# Patient Record
Sex: FEMALE | Race: WHITE
[De-identification: names, ages, dates, MRNs, and addresses within clinical notes are randomized per-mention and may not be internally consistent; named-entity substitution may affect disease eponyms.]

---

## 2017-09-17 ENCOUNTER — HOSPITAL ENCOUNTER (EMERGENCY)
Dept: HOSPITAL 62 - ER | Age: 72
Discharge: HOME | End: 2017-09-17
Payer: MEDICARE

## 2017-09-17 VITALS — DIASTOLIC BLOOD PRESSURE: 71 MMHG | SYSTOLIC BLOOD PRESSURE: 115 MMHG

## 2017-09-17 DIAGNOSIS — N39.0: Primary | ICD-10-CM

## 2017-09-17 DIAGNOSIS — R41.82: ICD-10-CM

## 2017-09-17 DIAGNOSIS — F02.80: ICD-10-CM

## 2017-09-17 DIAGNOSIS — I10: ICD-10-CM

## 2017-09-17 DIAGNOSIS — K21.9: ICD-10-CM

## 2017-09-17 DIAGNOSIS — R53.83: ICD-10-CM

## 2017-09-17 DIAGNOSIS — R53.1: ICD-10-CM

## 2017-09-17 DIAGNOSIS — G30.9: ICD-10-CM

## 2017-09-17 LAB
ALBUMIN SERPL-MCNC: 4 G/DL (ref 3.5–5)
ALP SERPL-CCNC: 47 U/L (ref 38–126)
ALT SERPL-CCNC: 12 U/L (ref 9–52)
ANION GAP SERPL CALC-SCNC: 11 MMOL/L (ref 5–19)
APPEARANCE UR: (no result)
AST SERPL-CCNC: 19 U/L (ref 14–36)
BASE EXCESS BLDV CALC-SCNC: 5 MMOL/L
BASOPHILS # BLD AUTO: 0 10^3/UL (ref 0–0.2)
BASOPHILS NFR BLD AUTO: 0.1 % (ref 0–2)
BILIRUB DIRECT SERPL-MCNC: 0.4 MG/DL (ref 0–0.4)
BILIRUB SERPL-MCNC: 0.7 MG/DL (ref 0.2–1.3)
BILIRUB UR QL STRIP: NEGATIVE
BUN SERPL-MCNC: 17 MG/DL (ref 7–20)
CALCIUM: 9.7 MG/DL (ref 8.4–10.2)
CHLORIDE SERPL-SCNC: 103 MMOL/L (ref 98–107)
CO2 SERPL-SCNC: 29 MMOL/L (ref 22–30)
CREAT SERPL-MCNC: 0.57 MG/DL (ref 0.52–1.25)
EOSINOPHIL # BLD AUTO: 0 10^3/UL (ref 0–0.6)
EOSINOPHIL NFR BLD AUTO: 0.1 % (ref 0–6)
ERYTHROCYTE [DISTWIDTH] IN BLOOD BY AUTOMATED COUNT: 13.2 % (ref 11.5–14)
GLUCOSE SERPL-MCNC: 144 MG/DL (ref 75–110)
GLUCOSE UR STRIP-MCNC: NEGATIVE MG/DL
HCO3 BLDV-SCNC: 30.9 MMOL/L (ref 20–32)
HCT VFR BLD CALC: 40.6 % (ref 36–47)
HGB BLD-MCNC: 14.3 G/DL (ref 12–15.5)
HGB HCT DIFFERENCE: 2.3
KETONES UR STRIP-MCNC: (no result) MG/DL
LYMPHOCYTES # BLD AUTO: 0.9 10^3/UL (ref 0.5–4.7)
LYMPHOCYTES NFR BLD AUTO: 8 % (ref 13–45)
MCH RBC QN AUTO: 30.6 PG (ref 27–33.4)
MCHC RBC AUTO-ENTMCNC: 35.1 G/DL (ref 32–36)
MCV RBC AUTO: 87 FL (ref 80–97)
MONOCYTES # BLD AUTO: 1.4 10^3/UL (ref 0.1–1.4)
MONOCYTES NFR BLD AUTO: 12.3 % (ref 3–13)
NEUTROPHILS # BLD AUTO: 8.9 10^3/UL (ref 1.7–8.2)
NEUTS SEG NFR BLD AUTO: 79.5 % (ref 42–78)
NITRITE UR QL STRIP: POSITIVE
PCO2 BLDV: 49.7 MMHG (ref 35–63)
PH BLDV: 7.41 [PH] (ref 7.3–7.42)
PH UR STRIP: 7 [PH] (ref 5–9)
POTASSIUM SERPL-SCNC: 3.9 MMOL/L (ref 3.6–5)
PROT SERPL-MCNC: 7.8 G/DL (ref 6.3–8.2)
PROT UR STRIP-MCNC: 100 MG/DL
PROTHROMBIN TIME: 13.1 SEC (ref 11.4–15.4)
RBC # BLD AUTO: 4.66 10^6/UL (ref 3.72–5.28)
SODIUM SERPL-SCNC: 143.3 MMOL/L (ref 137–145)
SP GR UR STRIP: 1.01
UROBILINOGEN UR-MCNC: NEGATIVE MG/DL (ref ?–2)
WBC # BLD AUTO: 11.2 10^3/UL (ref 4–10.5)

## 2017-09-17 PROCEDURE — 93005 ELECTROCARDIOGRAM TRACING: CPT

## 2017-09-17 PROCEDURE — 36415 COLL VENOUS BLD VENIPUNCTURE: CPT

## 2017-09-17 PROCEDURE — 99285 EMERGENCY DEPT VISIT HI MDM: CPT

## 2017-09-17 PROCEDURE — 83605 ASSAY OF LACTIC ACID: CPT

## 2017-09-17 PROCEDURE — 80053 COMPREHEN METABOLIC PANEL: CPT

## 2017-09-17 PROCEDURE — 82962 GLUCOSE BLOOD TEST: CPT

## 2017-09-17 PROCEDURE — 85610 PROTHROMBIN TIME: CPT

## 2017-09-17 PROCEDURE — 85025 COMPLETE CBC W/AUTO DIFF WBC: CPT

## 2017-09-17 PROCEDURE — 81001 URINALYSIS AUTO W/SCOPE: CPT

## 2017-09-17 PROCEDURE — 85379 FIBRIN DEGRADATION QUANT: CPT

## 2017-09-17 PROCEDURE — 93010 ELECTROCARDIOGRAM REPORT: CPT

## 2017-09-17 PROCEDURE — 96360 HYDRATION IV INFUSION INIT: CPT

## 2017-09-17 PROCEDURE — 70450 CT HEAD/BRAIN W/O DYE: CPT

## 2017-09-17 PROCEDURE — 51701 INSERT BLADDER CATHETER: CPT

## 2017-09-17 PROCEDURE — 87086 URINE CULTURE/COLONY COUNT: CPT

## 2017-09-17 PROCEDURE — 87040 BLOOD CULTURE FOR BACTERIA: CPT

## 2017-09-17 PROCEDURE — 84484 ASSAY OF TROPONIN QUANT: CPT

## 2017-09-17 PROCEDURE — 82803 BLOOD GASES ANY COMBINATION: CPT

## 2017-09-17 PROCEDURE — 71020: CPT

## 2017-09-17 NOTE — RADIOLOGY REPORT (SQ)
EXAM DESCRIPTION:  CT HEAD WITHOUT



COMPLETED DATE/TIME:  9/17/2017 2:26 pm



REASON FOR STUDY:  ams



COMPARISON:  9/19/2015.



TECHNIQUE:  Axial images acquired through the brain without intravenous contrast.  Images reviewed wi
th bone, brain and subdural windows.  Images stored on PACS.

All CT scanners at this facility use dose modulation, iterative reconstruction, and/or weight based d
osing when appropriate to reduce radiation dose to as low as reasonably achievable (ALARA).

[facility]: Dose Right  [facility]: CareDose    [facility]: Dose Right    [facility]: Teradose 4D    [facility]: Smart [facility] [facility]



RADIATION DOSE:  Up-to-date CT equipment and radiation dose reduction techniques were employed. CTDIv
ol: 64.6 mGy. DLP: 1163 mGy-cm.mGy.



LIMITATIONS:  None.



FINDINGS:  VENTRICLES: Prominent.

CEREBRUM: No masses.  No hemorrhage.  No midline shift.  Areas of low density in the white matter mos
t likely due to chronic micro-vascular ischemic change.  No evidence for acute infarction.

CEREBELLUM: No masses.  No hemorrhage.  No alteration of density.  No evidence for acute infarction.

EXTRAAXIAL SPACES: Age-related involutional change.  No fluid collections.  No masses.

ORBITS AND GLOBE: No intra- or extraconal masses.  Normal contour of globe without masses.

CALVARIUM: No fracture.

PARANASAL SINUSES: No fluid or mucosal thickening.

SOFT TISSUES: No mass or hematoma.

OTHER: No other significant finding.



IMPRESSION:  CHRONIC CHANGES OF ATROPHY AND MICROVASCULAR ISCHEMIA.  NO ACUTE PROCESS.



TECHNICAL DOCUMENTATION:  JOB ID:  [id number]

Quality ID # [id number]: Final reports with documentation of one or more dose reduction techniques (e.g., Au
tomated exposure control, adjustment of the mA and/or kV according to patient size, use of iterative 
reconstruction technique)

 2011 [employer]- All Rights Reserved

## 2017-09-17 NOTE — RADIOLOGY REPORT (SQ)
EXAM DESCRIPTION:  CHEST PA/LAT



COMPLETED DATE/TIME:  9/17/2017 2:34 pm



REASON FOR STUDY:  sob



COMPARISON:  9/15/2016.



EXAM PARAMETERS:  NUMBER OF VIEWS: two views

TECHNIQUE: Digital Frontal and Lateral radiographic views of the chest acquired.

RADIATION DOSE: NA

LIMITATIONS: none



FINDINGS:  LUNGS AND PLEURA: No opacities, masses or pneumothorax. No pleural effusion.

MEDIASTINUM AND HILAR STRUCTURES: No masses or contour abnormalities.

HEART AND VASCULAR STRUCTURES: Heart upper limits of normal size.  No evidence for failure.

BONES: No acute findings.

HARDWARE: None in the chest.

OTHER: No other significant finding.



IMPRESSION:  NO SIGNIFICANT RADIOGRAPHIC FINDING IN THE CHEST.



TECHNICAL DOCUMENTATION:  JOB ID:  [id number]

 2011 [employer]- All Rights Reserved

## 2017-09-17 NOTE — ER DOCUMENT REPORT
ED Medical Screen (RME)





- General


Chief Complaint: General Weakness


Stated Complaint: LETHARGIC


Time Seen by Provider: 09/17/17 13:39


TRAVEL OUTSIDE OF THE [place] IN LAST 30 DAYS: No





- HPI


Notes: 





09/17/17 13:44


ams since tihs am





- Related Data


Allergies/Adverse Reactions: 


 





No Known Allergies Allergy (Verified 09/19/15 20:19)


 











Past Medical History





- Past Medical History


Cardiac Medical History: Reports: Hx Hypertension


Renal/ Medical History: Denies: Hx Peritoneal Dialysis


GI Medical History: Reports: Hx Gastroesophageal Reflux Disease


Psychiatric Medical History: Reports: Hx Dementia





- Immunizations


Hx Diphtheria, Pertussis, Tetanus Vaccination: Yes





Review of Systems





- Review of Systems


Constitutional: Other - ams





Physical Exam





- Vital signs


Vitals: 





 











Temp Pulse Resp BP Pulse Ox


 


 98.2 F   102 H  18   115/71   94 


 


 09/17/17 13:22  09/17/17 13:22  09/17/17 13:22  09/17/17 13:22  09/17/17 13:22














- Respiratory


Respiratory status: No respiratory distress


Chest status: Nontender


Breath sounds: Normal


Chest palpation: Normal





Course





- Vital Signs


Vital signs: 





 











Temp Pulse Resp BP Pulse Ox


 


 98.2 F   102 H  18   115/71   94 


 


 09/17/17 13:22  09/17/17 13:22  09/17/17 13:22  09/17/17 13:22  09/17/17 13:22

## 2017-09-17 NOTE — ER DOCUMENT REPORT
ED General





- General


Chief Complaint: General Weakness


Stated Complaint: LETHARGIC


Time Seen by Provider: 09/17/17 13:39


TRAVEL OUTSIDE OF THE [place] IN LAST 30 DAYS: No





- HPI


Notes: 





Patient is a 72-year-old female with a history of Alzheimer's, hypertension, 

and GERD who presents to the ED with son and daughter with altered mental 

status that began this morning.  Son states that she has not been acting like 

herself, has been acting weaker than usual, and is not as communicative as she 

normally is.   patient has also not been eating well today.  She still 

producing wet and dirty diapers.  Patient does have a 24-hour caregiver in her 

daughter.  Son states that she does have a history of recurrent UTIs.  Patient 

is only able to ambulate with assistance, but she has not been wanting to stand

  even with the assistance today.  Son states that she still will talk randomly 

at times without any provocation.  She has not started any new medications.  

Denies any drug allergies.  Per Son:  denies any headache, fever, head injury, 

neck pain, URI, syncope, cough, shortness of breath, wheeze, dyspnea, vomiting/

diarrhea, urinary retention, hematuria, muscle paralysis/weakness, or rash.





- Related Data


Allergies/Adverse Reactions: 


 





No Known Allergies Allergy (Verified 09/19/15 20:19)


 











Past Medical History





- Social History


Smoking Status: Never Smoker


Chew tobacco use (# tins/day): No


Frequency of alcohol use: None


Drug Abuse: None


Family History: Reviewed & Not Pertinent





- Past Medical History


Cardiac Medical History: Reports: Hx Hypertension


Renal/ Medical History: Denies: Hx Peritoneal Dialysis


GI Medical History: Reports: Hx Gastroesophageal Reflux Disease


Psychiatric Medical History: Reports: Hx Dementia





- Immunizations


Hx Diphtheria, Pertussis, Tetanus Vaccination: Yes





Review of Systems





- Review of Systems


-: Yes ROS unobtainable due to patient's medical condition - see hpi otherwise 

for pertinent ROS


Hematologic/Lymphatic: denies: Easy bleeding, Easy bruising


Neurological/Psychological: denies: Seizure





Physical Exam





- Vital signs


Vitals: 


 











Temp Pulse Resp BP Pulse Ox


 


 98.2 F   102 H  18   115/71   94 


 


 09/17/17 13:22  09/17/17 13:22  09/17/17 13:22  09/17/17 13:22  09/17/17 13:22











Notes: 





PHYSICAL EXAMINATION:





GENERAL: Well-appearing, well-nourished and in no acute distress.  Alert and 

arousable.





HEAD: Atraumatic, normocephalic.  Non-tender.  No bhatia sign





EYES: Pupils equal round and reactive to light, extraocular movements intact, 

sclera anicteric, conjunctiva are normal.  No raccoon eyes/entrapment





ENT: EAC clear b/l.  TM's intact b/l without erythema, fluid, or perforation.  

Nares patent and without discharge.  oropharynx clear without exudates.  No 

tonsilar hypertrophy or erythema.  Moist mucous membranes.  No sinus 

tenderness.  No hemotympanum/CSF discharge.





NECK: Normal range of motion, supple without lymphadenopathy.  No rigidity.  No 

midline tenderness. 





Chest:  No flail chest.  equal rise/fall. Non-tender





LUNGS: Breath sounds clear to auscultation bilaterally and equal.  No wheezes 

rales or rhonchi.





HEART: Regular rate and rhythm without murmurs, rubs, gallops.





ABDOMEN: Soft, nontender, nondistended abdomen.  No guarding, no rebound.  No 

masses appreciated.  Normal bowel sounds present.  No CVA tenderness 

bilaterally.   





Musculoskeletal: Ext b/l:  FROM to passive/active. Strength 5+/5.  No deficits 

noted.  No bony tenderness of extremities.  No calf warmth, erythema, or 

tenderness to palp.  Deedee neg.





Extremities:  No cyanosis, clubbing, or edema b/l.  Peripheral pulses 2+.  

Capillary refill less than 2 seconds.





NEUROLOGICAL: unable to perform MMSE/NIH due to patient's Alzheimers.  Cranial 

nerves grossly intact.  Normal speech when she does speak (does not sound 

slurred, but is spoken in a low voice.  Normal sensory.  Reflexes 2+ b/l. 





PSYCH: Normal mood, normal affect.





SKIN: Warm, Dry, normal turgor, no rashes or lesions noted.





Course





- Re-evaluation


Re-evalutation: 





09/17/17 16:14


Reviewed case with Dr. Paul who is in agreement with discharge/plan:


Patient is an afebrile, well-hydrated, 72-year-old female who presents to the 

ED with altered mental status and UTI.  Vitals are stable.  PE otherwise 

unremarkable for any focal neurological deficits otherwise.  Exam was limited 

due to patient's Alzheimer's.  CBC showed a very mildly elevated white count of 

11.2.  CMP was generally unremarkable with a lactic acid of 2.4.  Urinalysis 

did show nitrates.  Urine culture is pending.  CT scan of the head and chest x-

ray were unremarkable for any acute pathology.  EKG and cardiac enzymes also 

unremarkable for any acute pathology at this time.  There was a very mildly 

elevated d-dimer at 1.05, but patient is not tachypneic, tachycardic, hyper/

hypotensive, not in any respiratory distress, no obvious signs of chest pain, 

and a well's score of 0.  No further evaluation warranted at this time with the 

low risk factors.  Rocephin 1 g was given IV today.  I will send her home with 

keflex to take as directed.  Recheck with your PCM in 2-3 days.  Return to the 

ED with any worsening/concerning symptoms otherwise as reviewed in discharge.  

Son is in agreement.





Low suspicion for any acute glaucoma, temporal arteritis, meningitis, 

intracranial hemorrhage, ischemic stroke, ACS, PE, pneumothorax, pericarditis, 

dissection, acute abdomen, mediastinitis, or fracture at this time.  Son is 

aware that her condition can change from initial presentation and that he needs 

to monitor symptoms closely for any acute changes and seek medical attention if 

so.








- Vital Signs


Vital signs: 


 











Temp Pulse Resp BP Pulse Ox


 


 98.2 F   102 H  18   115/71   94 


 


 09/17/17 13:22  09/17/17 13:22  09/17/17 13:22  09/17/17 13:22  09/17/17 13:22














- Laboratory


Result Diagrams: 


 09/17/17 13:54





 09/17/17 13:54


Laboratory results interpreted by me: 


 











  09/17/17 09/17/17 09/17/17





  13:54 13:54 13:54


 


WBC  11.2 H  


 


Seg Neutrophils %  79.5 H  


 


Lymphocytes %  8.0 L  


 


Absolute Neutrophils  8.9 H  


 


D-Dimer   


 


Glucose   144 H 


 


POC Glucose   


 


Lactic Acid    2.4 H


 


Urine Protein   


 


Urine Ketones   


 


Urine Blood   


 


Urine Nitrite   














  09/17/17 09/17/17 09/17/17





  13:54 13:57 14:42


 


WBC   


 


Seg Neutrophils %   


 


Lymphocytes %   


 


Absolute Neutrophils   


 


D-Dimer  1.05 H  


 


Glucose   


 


POC Glucose   138 H 


 


Lactic Acid   


 


Urine Protein    100 H


 


Urine Ketones    TRACE H


 


Urine Blood    LARGE H


 


Urine Nitrite    POSITIVE H














  09/17/17





  15:15


 


WBC 


 


Seg Neutrophils % 


 


Lymphocytes % 


 


Absolute Neutrophils 


 


D-Dimer 


 


Glucose 


 


POC Glucose  113 H


 


Lactic Acid 


 


Urine Protein 


 


Urine Ketones 


 


Urine Blood 


 


Urine Nitrite 














Discharge





- Discharge


Clinical Impression: 


UTI (urinary tract infection)


Qualifiers:


 Urinary tract infection type: site unspecified Hematuria presence: without 

hematuria Qualified Code(s): N39.0 - Urinary tract infection, site not specified





Altered mental status, unspecified


Qualifiers:


 Altered mental status type: unspecified Qualified Code(s): R41.82 - Altered 

mental status, unspecified





Condition: Stable


Disposition: HOME, SELF-CARE


Instructions:  Cephalexin ([facility]), Urinary Tract Infection ([facility]), Altered Mental 

Status ([facility])


Additional Instructions: 


Push fluids (i.e. water, cranberry juice)


Proper hygenic technique


Keep the skin clean


Tylenol/ibuprofen as needed


May use over the counter AZO for burning with urination


Take medications as directed


F/u with your PCM in 2-3 days for a recheck


Consider consult with a Urologist for ongoing/worsening symptoms.


Return to the ED with any worsening symptoms and/or development of fever, 

headache, chest pain, palpitations, syncope, shortness of breath, trouble 

breathing, abdominal pain, n/v/d, blood in stool/urine, muscle weakness/

paralysis, numbness/tingling, or other worsening symptoms that are concerning 

to you.


Prescriptions: 


Cephalexin Monohydrate [Keflex 500 mg Capsule] 500 mg PO BID #14 capsule


Referrals: 


HAYDER LANCE MD [Primary Care Provider] - 09/19/17

## 2017-11-09 ENCOUNTER — HOSPITAL ENCOUNTER (INPATIENT)
Dept: HOSPITAL 62 - ER | Age: 72
LOS: 3 days | Discharge: HOME | DRG: 872 | End: 2017-11-12
Attending: INTERNAL MEDICINE | Admitting: INTERNAL MEDICINE
Payer: MEDICARE

## 2017-11-09 DIAGNOSIS — F03.90: ICD-10-CM

## 2017-11-09 DIAGNOSIS — I10: ICD-10-CM

## 2017-11-09 DIAGNOSIS — G30.9: ICD-10-CM

## 2017-11-09 DIAGNOSIS — R73.9: ICD-10-CM

## 2017-11-09 DIAGNOSIS — K21.9: ICD-10-CM

## 2017-11-09 DIAGNOSIS — E78.5: ICD-10-CM

## 2017-11-09 DIAGNOSIS — F02.80: ICD-10-CM

## 2017-11-09 DIAGNOSIS — E03.9: ICD-10-CM

## 2017-11-09 DIAGNOSIS — E86.0: ICD-10-CM

## 2017-11-09 DIAGNOSIS — E87.6: ICD-10-CM

## 2017-11-09 DIAGNOSIS — G40.909: ICD-10-CM

## 2017-11-09 DIAGNOSIS — N39.0: ICD-10-CM

## 2017-11-09 DIAGNOSIS — A41.9: Primary | ICD-10-CM

## 2017-11-09 DIAGNOSIS — Z79.899: ICD-10-CM

## 2017-11-09 LAB
ALBUMIN SERPL-MCNC: 3.7 G/DL (ref 3.5–5)
ALP SERPL-CCNC: 56 U/L (ref 38–126)
ALT SERPL-CCNC: 9 U/L (ref 9–52)
ANION GAP SERPL CALC-SCNC: 15 MMOL/L (ref 5–19)
APPEARANCE UR: (no result)
AST SERPL-CCNC: 19 U/L (ref 14–36)
BASOPHILS # BLD AUTO: 0 10^3/UL (ref 0–0.2)
BASOPHILS # BLD AUTO: 0 10^3/UL (ref 0–0.2)
BASOPHILS NFR BLD AUTO: 0.3 % (ref 0–2)
BASOPHILS NFR BLD AUTO: 0.6 % (ref 0–2)
BILIRUB DIRECT SERPL-MCNC: 0.3 MG/DL (ref 0–0.4)
BILIRUB SERPL-MCNC: 0.4 MG/DL (ref 0.2–1.3)
BILIRUB UR QL STRIP: NEGATIVE
BUN SERPL-MCNC: 8 MG/DL (ref 7–20)
CALCIUM: 8.5 MG/DL (ref 8.4–10.2)
CHLORIDE SERPL-SCNC: 103 MMOL/L (ref 98–107)
CO2 SERPL-SCNC: 27 MMOL/L (ref 22–30)
CREAT SERPL-MCNC: 0.59 MG/DL (ref 0.52–1.25)
EOSINOPHIL # BLD AUTO: 0 10^3/UL (ref 0–0.6)
EOSINOPHIL # BLD AUTO: 0 10^3/UL (ref 0–0.6)
EOSINOPHIL NFR BLD AUTO: 0.1 % (ref 0–6)
EOSINOPHIL NFR BLD AUTO: 0.3 % (ref 0–6)
ERYTHROCYTE [DISTWIDTH] IN BLOOD BY AUTOMATED COUNT: 13.2 % (ref 11.5–14)
ERYTHROCYTE [DISTWIDTH] IN BLOOD BY AUTOMATED COUNT: 13.6 % (ref 11.5–14)
GLUCOSE SERPL-MCNC: 107 MG/DL (ref 75–110)
GLUCOSE UR STRIP-MCNC: 50 MG/DL
HCT VFR BLD CALC: 43.9 % (ref 36–47)
HCT VFR BLD CALC: 44.7 % (ref 36–47)
HGB BLD-MCNC: 14.9 G/DL (ref 12–15.5)
HGB BLD-MCNC: 15.4 G/DL (ref 12–15.5)
HGB HCT DIFFERENCE: 0.8
HGB HCT DIFFERENCE: 1.5
KETONES UR STRIP-MCNC: NEGATIVE MG/DL
LYMPHOCYTES # BLD AUTO: 1.3 10^3/UL (ref 0.5–4.7)
LYMPHOCYTES # BLD AUTO: 1.6 10^3/UL (ref 0.5–4.7)
LYMPHOCYTES NFR BLD AUTO: 21.9 % (ref 13–45)
LYMPHOCYTES NFR BLD AUTO: 9.8 % (ref 13–45)
MCH RBC QN AUTO: 29.7 PG (ref 27–33.4)
MCH RBC QN AUTO: 29.7 PG (ref 27–33.4)
MCHC RBC AUTO-ENTMCNC: 34 G/DL (ref 32–36)
MCHC RBC AUTO-ENTMCNC: 34.5 G/DL (ref 32–36)
MCV RBC AUTO: 86 FL (ref 80–97)
MCV RBC AUTO: 88 FL (ref 80–97)
MONOCYTES # BLD AUTO: 0.7 10^3/UL (ref 0.1–1.4)
MONOCYTES # BLD AUTO: 1.7 10^3/UL (ref 0.1–1.4)
MONOCYTES NFR BLD AUTO: 13.5 % (ref 3–13)
MONOCYTES NFR BLD AUTO: 9.9 % (ref 3–13)
NEUTROPHILS # BLD AUTO: 4.9 10^3/UL (ref 1.7–8.2)
NEUTROPHILS # BLD AUTO: 9.8 10^3/UL (ref 1.7–8.2)
NEUTS SEG NFR BLD AUTO: 67.5 % (ref 42–78)
NEUTS SEG NFR BLD AUTO: 76.1 % (ref 42–78)
NITRITE UR QL STRIP: NEGATIVE
PH UR STRIP: 8 [PH] (ref 5–9)
POTASSIUM SERPL-SCNC: 3.2 MMOL/L (ref 3.6–5)
PROT SERPL-MCNC: 7.4 G/DL (ref 6.3–8.2)
PROT UR STRIP-MCNC: >=500 MG/DL
RBC # BLD AUTO: 5.01 10^6/UL (ref 3.72–5.28)
RBC # BLD AUTO: 5.2 10^6/UL (ref 3.72–5.28)
SODIUM SERPL-SCNC: 145.3 MMOL/L (ref 137–145)
SP GR UR STRIP: 1.01
UROBILINOGEN UR-MCNC: NEGATIVE MG/DL (ref ?–2)
VALPROATE SERPL-MCNC: 36.4 UG/ML (ref 50–120)
WBC # BLD AUTO: 12.8 10^3/UL (ref 4–10.5)
WBC # BLD AUTO: 7.2 10^3/UL (ref 4–10.5)

## 2017-11-09 PROCEDURE — 85025 COMPLETE CBC W/AUTO DIFF WBC: CPT

## 2017-11-09 PROCEDURE — 81001 URINALYSIS AUTO W/SCOPE: CPT

## 2017-11-09 PROCEDURE — 99285 EMERGENCY DEPT VISIT HI MDM: CPT

## 2017-11-09 PROCEDURE — 87040 BLOOD CULTURE FOR BACTERIA: CPT

## 2017-11-09 PROCEDURE — 80053 COMPREHEN METABOLIC PANEL: CPT

## 2017-11-09 PROCEDURE — 83735 ASSAY OF MAGNESIUM: CPT

## 2017-11-09 PROCEDURE — 71010: CPT

## 2017-11-09 PROCEDURE — 80164 ASSAY DIPROPYLACETIC ACD TOT: CPT

## 2017-11-09 PROCEDURE — 96365 THER/PROPH/DIAG IV INF INIT: CPT

## 2017-11-09 PROCEDURE — 51702 INSERT TEMP BLADDER CATH: CPT

## 2017-11-09 PROCEDURE — 83605 ASSAY OF LACTIC ACID: CPT

## 2017-11-09 PROCEDURE — 80048 BASIC METABOLIC PNL TOTAL CA: CPT

## 2017-11-09 PROCEDURE — 36415 COLL VENOUS BLD VENIPUNCTURE: CPT

## 2017-11-09 PROCEDURE — 76380 CAT SCAN FOLLOW-UP STUDY: CPT

## 2017-11-09 PROCEDURE — 93010 ELECTROCARDIOGRAM REPORT: CPT

## 2017-11-09 PROCEDURE — 87086 URINE CULTURE/COLONY COUNT: CPT

## 2017-11-09 PROCEDURE — 93005 ELECTROCARDIOGRAM TRACING: CPT

## 2017-11-09 RX ADMIN — QUETIAPINE FUMARATE SCH MG: 25 TABLET, FILM COATED ORAL at 21:55

## 2017-11-09 RX ADMIN — SUCRALFATE SCH GM: 1 TABLET ORAL at 21:55

## 2017-11-09 RX ADMIN — RIVASTIGMINE TARTRATE SCH MG: 1.5 CAPSULE ORAL at 21:56

## 2017-11-09 NOTE — ER DOCUMENT REPORT
ED General





- General


Chief Complaint: Probable Seizure


Stated Complaint: POSSIBLE SEIZURE


Time Seen by Provider: 11/09/17 15:18


Mode of Arrival: Medic


Information source: Relative


Notes: 





This is a 72-year-old female with a history of Alzheimer's, seizures, 

hypertension who lives at home.  Home health aide called EMS because of a 

seizure at home.  EMS arrived at the scene and reported a blood pressure of 62/

44.  The Accu-Chek at home was 115.  Patient was given IV fluids by EMS and on 

arrival the blood pressure was 94/56.  The patient has advanced dementia.  Her 

eyes are open and she does not appear in any distress.  She is accompanied by 

her son is at the bedside.











TRAVEL OUTSIDE OF THE [place] IN LAST 30 DAYS: No





- HPI


Onset: Just prior to arrival


Onset/Duration: Sudden


Quality of pain: No pain


Severity: None


Pain Level: Denies


Associated symptoms: denies: Chills, Fever, Shortness of breath


Exacerbated by: Denies


Relieved by: Denies


Similar symptoms previously: Yes


Recently seen / treated by doctor: Yes





- Related Data


Allergies/Adverse Reactions: 


 





No Known Allergies Allergy (Verified 11/09/17 15:12)


 








Home Medications: 


 Current Home Medications





Divalproex Sodium [Divalproex Sodium ER] 750 mg PO QHS 11/09/17 [History]


Ferrous Sulfate [Feosol 325 mg Tablet] 325 mg PO [id number] 11/09/17 [History]


Levothyroxine Sodium [Synthroid 0.1 mg Tablet] 0.1 mg PO DAILY 11/09/17 [History

]


Lisinopril [Prinivil] 20 mg PO DAILY 11/09/17 [History]


Lorazepam [Ativan 1 mg Tablet] 1 mg PO Q12HP PRN 11/09/17 [History]


Memantine HCl [Namenda Xr] 28 mg PO DAILY 11/09/17 [History]


Omeprazole 20 mg PO DAILY 11/09/17 [History]


Pravastatin Sodium [Pravachol] 40 mg PO DAILY 11/09/17 [History]


Quetiapine Fumarate [Seroquel 25 mg Tablet] 25 mg PO QHS 11/09/17 [History]


Rivastigmine Tartrate [Rivastigmine] 3 mg PO Q12 11/09/17 [History]


Sertraline HCl [Zoloft] 150 mg PO DAILY 11/09/17 [History]


Sucralfate [Carafate 1 gm Tablet] 1 gm PO Q12 11/09/17 [History]


Tramadol HCl [Ultram 50 mg Tablet] 50 mg PO Q12HP PRN 11/09/17 [History]











Past Medical History





- General


Information source: Relative, Emergency Med Personnel





- Social History


Smoking Status: Never Smoker


Cigarette use (# per day): No


Chew tobacco use (# tins/day): No


Smoking Education Provided: No


Frequency of alcohol use: None


Drug Abuse: None


Lives with: Alone


Family History: Reviewed & Not Pertinent


Patient has suicidal ideation: No


Patient has homicidal ideation: No





- Past Medical History


Cardiac Medical History: Reports: Hx Hypertension


Renal/ Medical History: Denies: Hx Peritoneal Dialysis


GI Medical History: Reports: Hx Gastroesophageal Reflux Disease


Psychiatric Medical History: Reports: Hx Dementia





- Immunizations


Hx Diphtheria, Pertussis, Tetanus Vaccination: Yes





Review of Systems





- Review of Systems


Constitutional: denies: Chills, Fever


EENT: No symptoms reported


Cardiovascular: No symptoms reported


Respiratory: No symptoms reported


Gastrointestinal: No symptoms reported


Genitourinary: No symptoms reported


Female Genitourinary: No symptoms reported


Musculoskeletal: No symptoms reported


Skin: No symptoms reported


Hematologic/Lymphatic: No symptoms reported


Neurological/Psychological: See HPI





Physical Exam





- Vital signs


Vitals: 


 











Resp


 


 16 


 


 11/09/17 15:00











Notes: 





Physical exam:


 


GENERAL: 72-year-old female, alert and oriented 3, no acute distress 





HEAD: Atraumatic, normocephalic.





EYES: Pupils equal round and reactive to light, extraocular movements intact, 

sclera anicteric, conjunctiva are normal.





ENT: TMs normal, nares patent, oropharynx clear without exudates.  Moist mucous 

membranes.





NECK: Normal range of motion, supple without obvious mass or JVD.





LUNGS: Breath sounds clear to auscultation bilaterally and equal.  No wheezes 

rales or rhonchi.





HEART: Regular rate and rhythm without murmurs, rubs or gallops.





ABDOMEN: Soft, normoactive bowel sounds.  No tenderness to palpation.  No 

guarding, no rebound.  No masses appreciated.





EXTREMITIES: Normal range of motion, no pitting or edema.  No clubbing or 

cyanosis.





NEUROLOGICAL: Cranial nerves II through XII grossly intact.  Normal speech, 

moving all extremities.





SKIN: Warm, Dry, normal turgor, no rashes or lesions noted.





Course





- Re-evaluation


Re-evalutation: 





11/09/17 17:53


This is a 72-year-old female with a history of dementia, hypertension and 

seizures.  She lives at home and has a home health care assistant.  EMS was 

called for possible seizure.  The patient has been treated with oral 

antibiotics for recent UTI.  EMS noted that the patient was hypotensive and 

treated her with fluids.  From a mental status point of view, the patient has 

been baseline as per the son who is in the ER.  Patient's blood pressure has 

been low at times (in the high 80s systolic).  Her white count is 12.8 and her 

lactic acid is 4.6.  While it is possible that both of those are from a 

possible seizure that she had may had at home, it would not explain the 

fluctuating blood pressure.  Therefore, blood and urine cultures were sent, CT 

of the abdomen was done to rule out an obstructive uropathy, IV ceftriaxone was 

started.





- Vital Signs


Vital signs: 


 











Temp Pulse Resp BP Pulse Ox


 


 99.0 F   84   16   119/93 H  93 


 


 11/09/17 18:56  11/09/17 19:00  11/09/17 18:56  11/09/17 18:56  11/09/17 18:56














- Laboratory


Result Diagrams: 


 11/09/17 20:00





 11/09/17 16:15


Laboratory results interpreted by me: 


 











  11/09/17 11/09/17 11/09/17





  15:50 16:15 16:15


 


WBC   12.8 H 


 


Lymphocytes %   9.8 L 


 


Monocytes %   13.5 H 


 


Absolute Neutrophils   9.8 H 


 


Absolute Monocytes   1.7 H 


 


Sodium    145.3 H


 


Potassium    3.2 L


 


Lactic Acid   


 


Urine Protein  >=500 H  


 


Urine Glucose (UA)  50 H  


 


Urine Blood  LARGE H  


 


Valproic Acid    36.4 L














  11/09/17





  16:15


 


WBC 


 


Lymphocytes % 


 


Monocytes % 


 


Absolute Neutrophils 


 


Absolute Monocytes 


 


Sodium 


 


Potassium 


 


Lactic Acid  4.6 H


 


Urine Protein 


 


Urine Glucose (UA) 


 


Urine Blood 


 


Valproic Acid 














- Diagnostic Test


Radiology reviewed: Image reviewed, Reports reviewed - CT of the abdomen shows 

no evidence of obstructive uropathy.  Chest x-ray shows atelectasis.





- EKG Interpretation by Me


Rate: Normal


Rhythm: NSR - EKG shows normal sinus rhythm with a ventricular rate of 82, no 

acute ST-T wave changes





Discharge





- Discharge


Clinical Impression: 


 Hypotension, UTI, Dehydration





Condition: Stable


Disposition: ADMITTED AS INPATIENT


Admitting Provider: Hospitalist - Dr Berman


Unit Admitted: Telemetry

## 2017-11-09 NOTE — RADIOLOGY REPORT (SQ)
EXAM DESCRIPTION:  CHEST SINGLE VIEW



COMPLETED DATE/TIME:  11/9/2017 4:41 pm



REASON FOR STUDY:  hypotension



COMPARISON:  Chest films 9/17/2017, 9/15/2016



EXAM PARAMETERS:  NUMBER OF VIEWS: One view.

TECHNIQUE: Single frontal radiographic view of the chest acquired.

RADIATION DOSE: NA

LIMITATIONS: None.



FINDINGS:  LUNGS AND PLEURA: Mild right basilar bandlike atelectasis.  No fluffy alveolar infiltrates
 worrisome for edema or pneumonia.  No pleural effusion.  No pneumothorax.

MEDIASTINUM AND HILAR STRUCTURES: No masses.  Contour normal.

HEART AND VASCULAR STRUCTURES: Mild cardiomegaly

BONES: No acute findings.

HARDWARE: None in the chest.

OTHER: No other significant finding.



IMPRESSION:  Minimal right basilar bandlike atelectasis.

Stable mild cardiomegaly.



TECHNICAL DOCUMENTATION:  JOB ID:  [id number]

 2011 [employer]- All Rights Reserved

## 2017-11-09 NOTE — RADIOLOGY REPORT (SQ)
EXAM DESCRIPTION:  CT LTD RENAL STONE PROTOCOL ON



COMPLETED DATE/TIME:  11/9/2017 5:14 pm



REASON FOR STUDY:  hematuria flank pain, recent uti



COMPARISON:  None.



TECHNIQUE:  CT scan of the abdomen and pelvis performed without intravenous or oral contrast. Images 
reviewed with lung, soft tissue, and bone windows. Reconstructed coronal and sagittal MPR images revi
ewed. All images stored on PACS.

All CT scanners at this facility use dose modulation, iterative reconstruction, and/or weight based d
osing when appropriate to reduce radiation dose to as low as reasonably achievable (ALARA).

[facility]: Dose Right  [facility]: CareDose    [facility]: Dose Right    [facility]: Teradose 4D    [facility]: [employer] Technologies



RADIATION DOSE:  6.8mGy.



LIMITATIONS:  None.



FINDINGS:  LOWER CHEST: No significant findings. No nodules or infiltrates.

NON-CONTRASTED LIVER, SPLEEN, ADRENALS: 2 prominent hepatic cysts are present.  The spleen is normal.
  There is thickening of both adrenal glands.

PANCREAS: No masses. No peripancreatic inflammatory changes.

GALLBLADDER: Not identified.

RIGHT KIDNEY AND URETER: No suspicious masses. Assessment limited by lack of IV contrast.   No signif
icant calcifications.   No hydronephrosis or hydroureter.

LEFT KIDNEY AND URETER: No suspicious masses. Assessment limited by lack of IV contrast.   No signifi
cant calcifications.   No hydronephrosis or hydroureter.

AORTA AND RETROPERITONEUM: No aneurysm. No retroperitoneal masses or adenopathy.

BOWEL AND PERITONEAL CAVITY: Multiple diverticula arise from the descending colon and sigmoid.  No ac
[place] inflammatory changes are appreciated.  There is fluid in the cecum.

APPENDIX: Not identified.

PELVIS, BLADDER, AND ABDOMINAL WALL:Considerable fecal material is present in the left colon and in t
he rectum.  The urinary bladder has a Packer catheter and is not able to be evaluated.  The uterus is 
absent.  There is no adnexal mass or fluid collection.

BONES: Degenerative disc changes at L4-5.

OTHER: No other significant finding.



IMPRESSION:  1.  There are 2 prominent hepatic cysts.

2.  No urinary pathology is seen.

3.  There is diverticulosis coli.

4.  There is considerable fecal material in the left colon and in the rectum possibly suggesting cons
tipation.

5.  Lumbar degenerative disc changes.



COMMENT:  Quality ID # 436: Final reports with documentation of one or more dose reduction techniques
 (e.g., Automated exposure control, adjustment of the mA and/or kV according to patient size, use of 
iterative reconstruction technique)



TECHNICAL DOCUMENTATION:  JOB ID:  [id number]

 2011 [employer]- All Rights Reserved

## 2017-11-09 NOTE — EKG REPORT
SEVERITY:- ABNORMAL ECG -

SINUS RHYTHM

LEFT ATRIAL ABNORMALITY

LEFT VENTRICULAR HYPERTROPHY

PROLONGED QT INTERVAL

:

Confirmed by: Herve Fitzgerald MD 09-Nov-2017 19:16:57

## 2017-11-09 NOTE — PDOC H&P
History of Present Illness


Admission Date/PCP: 


  11/09/17 18:10





  HAYDER LANCE MD





Patient complains of: Seizure like activity


History of Present Illness: 


Patient is a 72-year-old  woman with history of advanced dementia, 

hyperlipidemia, hypothyroidism, seizure disorder was brought to the emergency 

room to be evaluated early today for possible seizure activity.  She is mostly 

nonverbal and history was obtained from the caregiver at bedside.  Earlier 

today home while she was getting to get a bath, caregiver noted that she 

started gurgling and shaking, she decided to call the EMS.  On EMS arrival she 

was found to be hypotensive with systolic blood pressure 62 and diastolic of 

44.  Blood sugar 115.  She was given IV fluid by EMS with improvement of her 

blood pressure systolic of 90.  The caregiver reported about a week ago she was 

given antibiotics doxycycline for UTI and she had 3 pills left. She does 

mumbles at times but otherwise, she has not offered any complaint per the 

caregiver.  No sign of coughing, no reports of vomiting abdominal pain no 

diarrhea.  She does take a few steps with assist but mostly not mobile.  She 

lives with her son.  Hospitalist consulted for admission for possible sepsis 

along with UTI. 





Past Medical History


Cardiac Medical History: Reports: Hypertension


GI Medical History: Reports: Gastroesophageal Reflux Disease


Psychiatric Medical History: Reports: Dementia





Social History


Smoking Status: Never Smoker





Family History


Family History: Reviewed & Not Pertinent


Parental Family History Reviewed: No


Children Family History Reviewed: Unknown


Sibling(s) Family History Reviewed.: Unknown





Medication/Allergy


Home Medications: 








Divalproex Sodium [Divalproex Sodium ER] 500 mg PO DAILY 11/09/17 


Doxycycline Monohydrate [Doxycycline Monohydrate] 1 tab PO BID 11/09/17 


Ergocalciferol (Vitamin D2) [Vitamin D] 1,000 unit PO DAILY 11/09/17 


Ferrous Sulfate [Ferrous Sulfate] 1 tab PO DAILY 11/09/17 


Levothyroxine Sodium [Synthroid] 75 mcg PO DAILY 11/09/17 


Lisinopril [Lisinopril] 1 tab PO DAILY 11/09/17 


Lorazepam [Lorazepam] 1 tab PO BID 11/09/17 


Memantine HCl [Namenda Xr] 1 tab PO DAILY 11/09/17 


Omeprazole [Omeprazole] 1 tab PO DAILY 11/09/17 


Pravastatin Sodium [Pravastatin Sodium] 20 mg PO DAILY 11/09/17 


Quetiapine Fumarate 25 mg PO BID 11/09/17 


Rivastigmine Tartrate [Rivastigmine] 1 tab PO BID 11/09/17 


Sertraline HCl [Sertraline HCl] 150 mg PO DAILY 11/09/17 


Sucralfate [Carafate 1 gm Tablet] 1 gm PO BID 11/09/17 


Zolpidem Tartrate [Ambien] 10 mg PO DAILY 11/09/17 








Allergies/Adverse Reactions: 


 





No Known Allergies Allergy (Verified 11/09/17 15:12)


 











Review of Systems


ROS unobtainable: Other - Most non verbal and history obtained from caregiver


Constitutional: ABSENT: chills, fever(s), headache(s), weight gain, weight loss


Eyes: ABSENT: visual disturbances





Physical Exam


Vital Signs: 


 











Temp Pulse Resp BP Pulse Ox


 


 98.8 F      19   106/75   98 


 


 11/09/17 17:46     11/09/17 17:46  11/09/17 17:46  11/09/17 17:46











General appearance: PRESENT: no acute distress, other - Pleasant elderly woman


Head exam: PRESENT: atraumatic, normocephalic


Eye exam: PRESENT: EOMI, PERRLA.  ABSENT: scleral icterus


Mouth exam: PRESENT: moist, tongue midline


Neck exam: ABSENT: JVD


Respiratory exam: PRESENT: clear to auscultation jose.  ABSENT: rales, rhonchi, 

wheezes


Cardiovascular exam: PRESENT: RRR.  ABSENT: diastolic murmur, rubs, systolic 

murmur


Vascular exam: PRESENT: normal capillary refill


GI/Abdominal exam: PRESENT: normal bowel sounds, soft.  ABSENT: distended, 

guarding, mass, organolmegaly, rebound, tenderness


Rectal exam: PRESENT: deferred


Extremities exam: PRESENT: full ROM.  ABSENT: calf tenderness, clubbing, pedal 

edema


Neurological exam: PRESENT: alert, awake


Psychiatric exam: PRESENT: appropriate affect, normal mood.  ABSENT: homicidal 

ideation, suicidal ideation


Skin exam: PRESENT: dry, warm.  ABSENT: cyanosis, rash





Results


Laboratory Results: 





 











  11/09/17 11/09/17 11/09/17





  16:15 16:15 16:15


 


WBC  12.8 H  


 


RBC  5.01  


 


Hgb  14.9  


 


Hct  43.9  


 


MCV  88  


 


Plt Count  218  


 


Lymphocytes %  9.8 L  


 


Sodium   145.3 H 


 


Potassium   3.2 L 


 


Chloride   103 


 


Carbon Dioxide   27 


 


BUN   8 


 


Creatinine   0.59 


 


Glucose   107 


 


Lactic Acid    4.6 H








Impressions: 


 





Chest X-Ray  11/09/17 15:38


IMPRESSION:  Minimal right basilar bandlike atelectasis.


Stable mild cardiomegaly.


 








Limited or Localized CT  11/09/17 17:00


IMPRESSION:  1.  There are 2 prominent hepatic cysts.


2.  No urinary pathology is seen.


3.  There is diverticulosis coli.


4.  There is considerable fecal material in the left colon and in the rectum 

possibly suggesting constipation.


5.  Lumbar degenerative disc changes.


 














Assessment & Plan





- Diagnosis


(1) Sepsis associated hypotension


Is this a current diagnosis for this admission?: Yes   


Plan: 


Secondary to urinary source


Has been on p.o. antibiotics for UTI outpatient


Blood and urine cultures sent


Continue ceftriaxone pending culture


Chest x-ray with atelectasis


Follow-up repeat lactic acid








(2) Hypotension


Is this a current diagnosis for this admission?: Yes   


Plan: 


Holding home antihypertensive


BP improved


Continue IV fluid








(3) Advanced dementia


Is this a current diagnosis for this admission?: Yes   


Plan: 


Continue home regimen








(4) Hypokalemia


Is this a current diagnosis for this admission?: Yes   


Plan: 


Potassium supplement added


Follow-up labs in the morning








(5) Hyperlipidemia


Is this a current diagnosis for this admission?: Yes   


Plan: 


Continue statin








(6) Hypothyroidism


Is this a current diagnosis for this admission?: Yes   


Plan: 


Continue Synthroid








(7) DVT prophylaxis


Is this a current diagnosis for this admission?: Yes   


Plan: 


On Lovenox 








- Time


Time Spent: 50 to 70 Minutes


Medications reviewed and adjusted accordingly: Yes


Anticipated discharge: Home





- Inpatient Certification


Medical Necessity: Failure to Improve With Outpatient Therapy, Need For IV 

Fluids, Need for IV Antibiotics

## 2017-11-10 LAB
ANION GAP SERPL CALC-SCNC: 10 MMOL/L (ref 5–19)
BASOPHILS # BLD AUTO: 0 10^3/UL (ref 0–0.2)
BASOPHILS NFR BLD AUTO: 0.6 % (ref 0–2)
BUN SERPL-MCNC: 10 MG/DL (ref 7–20)
CALCIUM: 8 MG/DL (ref 8.4–10.2)
CHLORIDE SERPL-SCNC: 111 MMOL/L (ref 98–107)
CO2 SERPL-SCNC: 24 MMOL/L (ref 22–30)
CREAT SERPL-MCNC: 0.6 MG/DL (ref 0.52–1.25)
EOSINOPHIL # BLD AUTO: 0.1 10^3/UL (ref 0–0.6)
EOSINOPHIL NFR BLD AUTO: 1 % (ref 0–6)
ERYTHROCYTE [DISTWIDTH] IN BLOOD BY AUTOMATED COUNT: 13.4 % (ref 11.5–14)
GLUCOSE SERPL-MCNC: 90 MG/DL (ref 75–110)
HCT VFR BLD CALC: 33.2 % (ref 36–47)
HGB BLD-MCNC: 11.6 G/DL (ref 12–15.5)
HGB HCT DIFFERENCE: 1.6
LYMPHOCYTES # BLD AUTO: 2.6 10^3/UL (ref 0.5–4.7)
LYMPHOCYTES NFR BLD AUTO: 39.1 % (ref 13–45)
MCH RBC QN AUTO: 29.8 PG (ref 27–33.4)
MCHC RBC AUTO-ENTMCNC: 34.8 G/DL (ref 32–36)
MCV RBC AUTO: 86 FL (ref 80–97)
MONOCYTES # BLD AUTO: 0.8 10^3/UL (ref 0.1–1.4)
MONOCYTES NFR BLD AUTO: 11.5 % (ref 3–13)
NEUTROPHILS # BLD AUTO: 3.2 10^3/UL (ref 1.7–8.2)
NEUTS SEG NFR BLD AUTO: 47.8 % (ref 42–78)
POTASSIUM SERPL-SCNC: 4.4 MMOL/L (ref 3.6–5)
RBC # BLD AUTO: 3.88 10^6/UL (ref 3.72–5.28)
SODIUM SERPL-SCNC: 144.5 MMOL/L (ref 137–145)
WBC # BLD AUTO: 6.7 10^3/UL (ref 4–10.5)

## 2017-11-10 RX ADMIN — RIVASTIGMINE TARTRATE SCH MG: 1.5 CAPSULE ORAL at 09:36

## 2017-11-10 RX ADMIN — LANSOPRAZOLE SCH MG: 15 TABLET, ORALLY DISINTEGRATING, DELAYED RELEASE ORAL at 05:21

## 2017-11-10 RX ADMIN — SUCRALFATE SCH GM: 1 TABLET ORAL at 21:58

## 2017-11-10 RX ADMIN — FERROUS SULFATE TAB 325 MG (65 MG ELEMENTAL FE) SCH MG: 325 (65 FE) TAB at 09:38

## 2017-11-10 RX ADMIN — RIVASTIGMINE TARTRATE SCH MG: 1.5 CAPSULE ORAL at 22:02

## 2017-11-10 RX ADMIN — SUCRALFATE SCH GM: 1 TABLET ORAL at 09:36

## 2017-11-10 RX ADMIN — ENOXAPARIN SODIUM SCH MG: 30 INJECTION SUBCUTANEOUS at 09:39

## 2017-11-10 RX ADMIN — QUETIAPINE FUMARATE SCH MG: 25 TABLET, FILM COATED ORAL at 21:56

## 2017-11-10 RX ADMIN — QUETIAPINE FUMARATE SCH MG: 25 TABLET, FILM COATED ORAL at 09:36

## 2017-11-10 RX ADMIN — SERTRALINE HYDROCHLORIDE SCH MG: 50 TABLET ORAL at 09:36

## 2017-11-10 RX ADMIN — CEFTRIAXONE SCH ML: 1 INJECTION, SOLUTION INTRAVENOUS at 09:37

## 2017-11-10 RX ADMIN — SODIUM CHLORIDE PRN ML: 0.45 INJECTION, SOLUTION INTRAVENOUS at 23:47

## 2017-11-10 NOTE — PDOC PROGRESS REPORT
Subjective


Progress Note for:: 11/10/17


Subjective:: 


Patient is a 72-year-old  woman with history of advanced dementia, 

hyperglycemia, hypothyroidism, seizure disorder was brought to the emergency 

room to be evaluated on 11/9/2017 for possible seizure activity.  She is mostly 

nonverbal and history obtained from the caregiver at bedside on admission.  On 

the day of admission, while she was getting a bath, the caregiver noted that 

she started gurgling and shaking, she decided to call the EMS.  On EMS arrival 

she was found to be hypotensive with systolic blood pressure 62 and diastolic 

of 44.  Blood sugar 115.  She was given IV fluid by EMS with improvement of her 

blood pressure in ED. About a week ago she was given antibiotic for UTI which 

she did not completely, she had about 3 pills left. 


Seen and examined this am, no new reports from staff nor son at bedside. 


Son was updated and he was expecting her caregiver to be coming later to be 

with her. 





Physical Exam


Vital Signs: 


 











Temp Pulse Resp BP Pulse Ox


 


 97.3 F   78   14   110/58 L  98 


 


 11/10/17 07:30  11/10/17 07:30  11/10/17 07:30  11/10/17 07:30  11/10/17 07:30








 Intake & Output











 11/09/17 11/10/17 11/11/17





 06:59 06:59 06:59


 


Intake Total  200 


 


Output Total  300 


 


Balance  -100 


 


Weight  63.8 kg 











General appearance: PRESENT: no acute distress, other - Pleasant elderly woman


Head exam: PRESENT: atraumatic, normocephalic


Eye exam: PRESENT: conjunctiva pink, EOMI.  ABSENT: scleral icterus


Mouth exam: PRESENT: moist, tongue midline, other - edentulous


Respiratory exam: PRESENT: clear to auscultation jose.  ABSENT: rales, rhonchi, 

wheezes


Cardiovascular exam: PRESENT: RRR, systolic murmur.  ABSENT: diastolic murmur


Vascular exam: PRESENT: normal capillary refill


GI/Abdominal exam: PRESENT: normal bowel sounds, soft.  ABSENT: distended, 

guarding, mass, organolmegaly, rebound, tenderness


Rectal exam: PRESENT: deferred


Extremities exam: PRESENT: full ROM.  ABSENT: calf tenderness, clubbing, pedal 

edema


Neurological exam: PRESENT: alert, awake


Psychiatric exam: PRESENT: appropriate affect, normal mood


Skin exam: PRESENT: dry, intact, warm.  ABSENT: cyanosis, rash





Results


Laboratory Results: 


 





 11/10/17 04:05 





 11/10/17 04:05 





 











  11/09/17 11/09/17 11/09/17





  20:00 20:00 20:55


 


WBC  7.2  


 


RBC  5.20  


 


Hgb  15.4  


 


Hct  44.7  


 


MCV  86  


 


MCH  29.7  


 


MCHC  34.5  


 


RDW  13.6  


 


Plt Count  189  


 


Seg Neutrophils %  67.5  


 


Lymphocytes %  21.9  


 


Monocytes %  9.9  


 


Eosinophils %  0.1  


 


Basophils %  0.6  


 


Absolute Neutrophils  4.9  


 


Absolute Lymphocytes  1.6  


 


Absolute Monocytes  0.7  


 


Absolute Eosinophils  0.0  


 


Absolute Basophils  0.0  


 


Sodium   


 


Potassium   


 


Chloride   


 


Carbon Dioxide   


 


Anion Gap   


 


BUN   


 


Creatinine   


 


Est GFR ( Amer)   


 


Est GFR (Non-Af Amer)   


 


Glucose   


 


Lactic Acid    2.6 H


 


Calcium   


 


Magnesium   1.7 














  11/10/17 11/10/17 11/10/17





  04:05 04:05 08:28


 


WBC  6.7  


 


RBC  3.88  


 


Hgb  11.6 L D  


 


Hct  33.2 L  


 


MCV  86  


 


MCH  29.8  


 


MCHC  34.8  


 


RDW  13.4  


 


Plt Count  200  


 


Seg Neutrophils %  47.8  


 


Lymphocytes %  39.1  


 


Monocytes %  11.5  


 


Eosinophils %  1.0  


 


Basophils %  0.6  


 


Absolute Neutrophils  3.2  


 


Absolute Lymphocytes  2.6  


 


Absolute Monocytes  0.8  


 


Absolute Eosinophils  0.1  


 


Absolute Basophils  0.0  


 


Sodium   144.5 


 


Potassium   4.4  D 


 


Chloride   111 H 


 


Carbon Dioxide   24 


 


Anion Gap   10 


 


BUN   10 


 


Creatinine   0.60 


 


Est GFR ( Amer)   > 60 


 


Est GFR (Non-Af Amer)   > 60 


 


Glucose   90 


 


Lactic Acid    1.1


 


Calcium   8.0 L 


 


Magnesium   











Impressions: 


 





Chest X-Ray  11/09/17 15:38


IMPRESSION:  Minimal right basilar bandlike atelectasis.


Stable mild cardiomegaly.


 








Limited or Localized CT  11/09/17 17:00


IMPRESSION:  1.  There are 2 prominent hepatic cysts.


2.  No urinary pathology is seen.


3.  There is diverticulosis coli.


4.  There is considerable fecal material in the left colon and in the rectum 

possibly suggesting constipation.


5.  Lumbar degenerative disc changes.


 














Assessment & Plan





- Diagnosis


(1) Sepsis associated hypotension


Is this a current diagnosis for this admission?: Yes   


Plan: 


Likely 2/2 to urinary source


Has been on p.o. antibiotics for UTI outpatient


Blood culture pending and urine culture thus far negative to date


Continue ceftriaxone 


Lactic acidosis resolved








(2) Hypotension


Is this a current diagnosis for this admission?: Yes   


Plan: 


Holding home antihypertensive


Blood pressure borderline low


Continue IVF








(3) Advanced dementia


Is this a current diagnosis for this admission?: Yes   


Plan: 


Continue home regimen








(4) Hypokalemia


Is this a current diagnosis for this admission?: Yes   


Plan: 


Resolved 








(5) Hyperlipidemia


Is this a current diagnosis for this admission?: Yes   


Plan: 


Continue statin








(6) Hypothyroidism


Is this a current diagnosis for this admission?: Yes   


Plan: 


Continue Synthroid








(7) DVT prophylaxis


Is this a current diagnosis for this admission?: Yes   


Plan: 


On Lovenox 








- Time


Time Spent with patient: 15-24 minutes

## 2017-11-11 RX ADMIN — RIVASTIGMINE TARTRATE SCH MG: 1.5 CAPSULE ORAL at 22:03

## 2017-11-11 RX ADMIN — LANSOPRAZOLE SCH MG: 15 TABLET, ORALLY DISINTEGRATING, DELAYED RELEASE ORAL at 05:03

## 2017-11-11 RX ADMIN — FERROUS SULFATE TAB 325 MG (65 MG ELEMENTAL FE) SCH MG: 325 (65 FE) TAB at 09:38

## 2017-11-11 RX ADMIN — SUCRALFATE SCH GM: 1 TABLET ORAL at 22:03

## 2017-11-11 RX ADMIN — SUCRALFATE SCH GM: 1 TABLET ORAL at 09:39

## 2017-11-11 RX ADMIN — SODIUM CHLORIDE PRN ML: 0.45 INJECTION, SOLUTION INTRAVENOUS at 12:49

## 2017-11-11 RX ADMIN — QUETIAPINE FUMARATE SCH MG: 25 TABLET, FILM COATED ORAL at 09:39

## 2017-11-11 RX ADMIN — CEFTRIAXONE SCH ML: 1 INJECTION, SOLUTION INTRAVENOUS at 09:38

## 2017-11-11 RX ADMIN — SERTRALINE HYDROCHLORIDE SCH MG: 50 TABLET ORAL at 09:39

## 2017-11-11 RX ADMIN — ENOXAPARIN SODIUM SCH MG: 30 INJECTION SUBCUTANEOUS at 09:41

## 2017-11-11 RX ADMIN — RIVASTIGMINE TARTRATE SCH MG: 1.5 CAPSULE ORAL at 09:39

## 2017-11-11 NOTE — PDOC PROGRESS REPORT
Subjective


Progress Note for:: 11/11/17


Subjective:: 


Patient is a 72-year-old  woman with history of advanced dementia, 

hyperglycemia, hypothyroidism, seizure disorder was brought to the emergency 

room to be evaluated on 11/9/2017 for possible seizure activity.  She is mostly 

nonverbal and history obtained from the caregiver at bedside on admission.  On 

the day of admission, while she was getting a bath, the caregiver noted that 

she started gurgling and shaking, she decided to call the EMS.  On EMS arrival 

she was found to be hypotensive with systolic blood pressure 62 and diastolic 

of 44.  Blood sugar 115.  She was given IV fluid by EMS with improvement of her 

blood pressure in ED. About a week ago she was given antibiotic for UTI which 

she did not completely, she had about 3 pills left.  Started on ceftriaxone, 

blood and urine culture remain negative.  She has been afebrile. 


Seen and examined this am, no acute events reported overnight. She was being 

fed by her son. He was updated. 





Physical Exam


Vital Signs: 


 











Temp Pulse Resp BP Pulse Ox


 


 98.0 F   83   18   139/88 H  96 


 


 11/11/17 11:43  11/11/17 11:43  11/11/17 11:43  11/11/17 11:43  11/11/17 11:43








 Intake & Output











 11/10/17 11/11/17 11/12/17





 06:59 06:59 06:59


 


Intake Total 200 2163 


 


Output Total 300 400 


 


Balance -100 1763 


 


Weight 63.8 kg 67 kg 











General appearance: PRESENT: no acute distress, other - pleasantly demented 

elderly woman


Head exam: PRESENT: atraumatic, normocephalic


Eye exam: PRESENT: EOMI


Mouth exam: PRESENT: moist


Teeth exam: PRESENT: edentulous


Neck exam: PRESENT: full ROM.  ABSENT: JVD


Respiratory exam: PRESENT: clear to auscultation jose, symmetrical, unlabored.  

ABSENT: accessory muscle use


Cardiovascular exam: PRESENT: +S1, +S2, systolic murmur


GI/Abdominal exam: PRESENT: normal bowel sounds, soft.  ABSENT: distended, 

tenderness


Rectal exam: PRESENT: deferred


Musculoskeletal exam: PRESENT: other.  ABSENT: ambulatory


Neurological exam: PRESENT: alert, awake


Psychiatric exam: PRESENT: normal mood


Skin exam: PRESENT: dry, intact, warm





Results


Laboratory Results: 


 





 11/10/17 04:05 





 11/10/17 04:05 








Impressions: 


 





Chest X-Ray  11/09/17 15:38


IMPRESSION:  Minimal right basilar bandlike atelectasis.


Stable mild cardiomegaly.


 








Limited or Localized CT  11/09/17 17:00


IMPRESSION:  1.  There are 2 prominent hepatic cysts.


2.  No urinary pathology is seen.


3.  There is diverticulosis coli.


4.  There is considerable fecal material in the left colon and in the rectum 

possibly suggesting constipation.


5.  Lumbar degenerative disc changes.


 














Assessment & Plan





- Diagnosis


(1) Sepsis associated hypotension


Is this a current diagnosis for this admission?: Yes   


Plan: 


Likely 2/2 to urinary source


Has been on p.o. antibiotics for UTI outpatient


Blood culture and urine culture thus far negative to date


Continue ceftriaxone 


If blood culture remains negative she may be discharged home in am








(2) Hypotension


Qualifiers: 


   Hypotension type: other hypotension type   Qualified Code(s): I95.89 - Other 

hypotension   


Is this a current diagnosis for this admission?: Yes   


Plan: 


Resolved 


Holding home antihypertensive


May need to continue to hold home BP medication on discharge











(3) Advanced dementia


Is this a current diagnosis for this admission?: Yes   


Plan: 


Continue home regimen








(4) Hyperlipidemia


Is this a current diagnosis for this admission?: Yes   


Plan: 


Continue statin








(5) Hypothyroidism


Qualifiers: 


   Hypothyroidism type: acquired   Qualified Code(s): E03.9 - Hypothyroidism, 

unspecified   


Is this a current diagnosis for this admission?: Yes   


Plan: 


Continue Synthroid








(6) DVT prophylaxis


Is this a current diagnosis for this admission?: Yes   


Plan: 


On Lovenox 








- Time


Time Spent with patient: 25-34 minutes


Anticipated discharge: Home


Within: within 24 hours

## 2017-11-12 VITALS — SYSTOLIC BLOOD PRESSURE: 111 MMHG | DIASTOLIC BLOOD PRESSURE: 67 MMHG

## 2017-11-12 RX ADMIN — SERTRALINE HYDROCHLORIDE SCH MG: 50 TABLET ORAL at 09:14

## 2017-11-12 RX ADMIN — LANSOPRAZOLE SCH MG: 15 TABLET, ORALLY DISINTEGRATING, DELAYED RELEASE ORAL at 05:23

## 2017-11-12 RX ADMIN — CEFTRIAXONE SCH ML: 1 INJECTION, SOLUTION INTRAVENOUS at 09:13

## 2017-11-12 RX ADMIN — FERROUS SULFATE TAB 325 MG (65 MG ELEMENTAL FE) SCH MG: 325 (65 FE) TAB at 08:53

## 2017-11-12 RX ADMIN — ENOXAPARIN SODIUM SCH MG: 30 INJECTION SUBCUTANEOUS at 09:15

## 2017-11-12 RX ADMIN — RIVASTIGMINE TARTRATE SCH MG: 1.5 CAPSULE ORAL at 09:13

## 2017-11-12 RX ADMIN — SUCRALFATE SCH GM: 1 TABLET ORAL at 09:14

## 2017-11-12 NOTE — PDOC DISCHARGE SUMMARY
General





- Admit/Disc Date/PCP


Admission Date/Primary Care Provider: 


  11/09/17 18:21





  HAYDER LANCE MD





Discharge Date: 11/12/17





- Discharge Diagnosis


(1) Sepsis associated hypotension


Is this a current diagnosis for this admission?: Yes   





(2) Urinary tract infection


Is this a current diagnosis for this admission?: Yes   





(3) Hypotension


Is this a current diagnosis for this admission?: Yes   





(4) Advanced dementia


Is this a current diagnosis for this admission?: Yes   





(5) Hyperlipidemia


Is this a current diagnosis for this admission?: Yes   





(6) Hypothyroidism


Is this a current diagnosis for this admission?: Yes   





(7) DVT prophylaxis


Is this a current diagnosis for this admission?: Yes   





- Additional Information


Discharge Activity: Activity As Tolerated


Home Medications: 








Divalproex Sodium [Divalproex Sodium ER] 750 mg PO QHS 11/09/17 


Ferrous Sulfate [Feosol 325 mg Tablet] 325 mg PO [id number] 11/09/17 


Levothyroxine Sodium [Synthroid 0.1 mg Tablet] 0.1 mg PO DAILY 11/09/17 


Lorazepam [Ativan 1 mg Tablet] 1 mg PO Q12HP PRN 11/09/17 


Memantine HCl [Namenda Xr] 28 mg PO DAILY 11/09/17 


Omeprazole 20 mg PO DAILY 11/09/17 


Pravastatin Sodium [Pravachol] 40 mg PO DAILY 11/09/17 


Quetiapine Fumarate [Seroquel 25 mg Tablet] 25 mg PO QHS 11/09/17 


Rivastigmine Tartrate [Rivastigmine] 3 mg PO Q12 11/09/17 


Sertraline HCl [Zoloft] 150 mg PO DAILY 11/09/17 


Sucralfate [Carafate 1 gm Tablet] 1 gm PO Q12 11/09/17 


Tramadol HCl [Ultram 50 mg Tablet] 50 mg PO Q12HP PRN 11/09/17 


Levofloxacin [Levaquin 500 mg Tablet] 500 mg PO DAILY #1 tablet 11/12/17 


Lisinopril 5 mg PO DAILY #30 tablet 11/12/17 


Quetiapine Fumarate [Seroquel 25 mg Tablet] 25 mg PO QHS  tablet 11/12/17 











History of Present Illness


History of Present Illness: 


Patient is a 72-year-old  woman with history of advanced dementia, 

hyperlipidemia, hypothyroidism, seizure disorder was brought to the emergency 

room to be evaluated early today for possible seizure activity.  She is mostly 

nonverbal and history was obtained from the caregiver at bedside.  Earlier 

today home while she was getting to get a bath, caregiver noted that she 

started gurgling and shaking, she decided to call the EMS.  On EMS arrival she 

was found to be hypotensive with systolic blood pressure 62 and diastolic of 

44.  Blood sugar 115.  She was given IV fluid by EMS with improvement of her 

blood pressure systolic of 90.  The caregiver reported about a week ago she was 

given antibiotics doxycycline for UTI and she had 3 pills left. She does 

mumbles at times but otherwise, she has not offered any complaint per the 

caregiver.  No sign of coughing, no reports of vomiting abdominal pain no 

diarrhea.  She does take a few steps with assist but mostly not mobile.  She 

lives with her son.  Hospitalist consulted for admission for possible sepsis 

along with UTI. 





Hospital Course


Hospital Course: 


Patient is a 72-year-old  woman with history of advanced dementia, 

hyperglycemia, hypothyroidism, seizure disorder was brought to the emergency 

room to be evaluated on 11/9/2017 for possible seizure activity.  She is mostly 

nonverbal and history obtained from the caregiver at bedside on admission.  On 

the day of admission, while she was getting a bath, the caregiver noted that 

she started gurgling and shaking, she decided to call the EMS.  On EMS arrival 

she was found to be hypotensive with systolic blood pressure 62 and diastolic 

of 44. Lactic acid was noted to be 4.6 and resolved.  Blood sugar 115.  She was 

given IV fluid by EMS with improvement of her blood pressure in ED. About a 

week ago she was given antibiotic for UTI which she did not completely, she had 

about 3 pills left.  Started on ceftriaxone this admission. Blood and urine 

culture remain negative.  She has been afebrile and she probably has been 

partially treated.  She was on lisinopril 20 mg p.o. daily which has been held 

since admission and last few reading SBP150-160's, therefore she was only 

resume on lisinopril 5 mg p.o. daily.  Have instructed the son to keep a log of 

her blood pressure for her PCP for further management.  Received 4 days of 

ceftriaxone and she will be given additional day of Levaquin for 5 days.  She 

does have a caregiver and the son helps care for her in the evening.  She is 

being discharged home for further management and follow-up with PCP.








Physical Exam


Vital Signs: 


 











Temp Pulse Resp BP Pulse Ox


 


 97.7 F   67   18   146/72 H  93 


 


 11/12/17 08:00  11/12/17 08:00  11/12/17 08:00  11/12/17 08:00  11/12/17 08:00








 Intake & Output











 11/11/17 11/12/17 11/13/17





 06:59 06:59 06:59


 


Intake Total 2163 2176 


 


Output Total 400  


 


Balance 1763 2176 


 


Weight 67 kg  











General appearance: PRESENT: no acute distress, other - pleasant demented 

elderly woman


Head exam: PRESENT: atraumatic, normocephalic


Eye exam: PRESENT: EOMI


Mouth exam: PRESENT: moist, neck supple


Neck exam: PRESENT: full ROM


Respiratory exam: PRESENT: clear to auscultation jose, symmetrical, unlabored.  

ABSENT: accessory muscle use


Cardiovascular exam: PRESENT: RRR, +S1, systolic murmur


GI/Abdominal exam: PRESENT: normal bowel sounds, soft.  ABSENT: distended, 

tenderness


Rectal exam: PRESENT: deferred


Extremities exam: PRESENT: full ROM.  ABSENT: pedal edema


Musculoskeletal exam: PRESENT: full ROM


Neurological exam: PRESENT: alert, awake, other - demented


Psychiatric exam: PRESENT: appropriate affect


Skin exam: PRESENT: dry, warm





Results


Laboratory Results: 


 





 11/10/17 04:05 





 11/10/17 04:05 








Impressions: 


 





Chest X-Ray  11/09/17 15:38


IMPRESSION:  Minimal right basilar bandlike atelectasis.


Stable mild cardiomegaly.


 








Limited or Localized CT  11/09/17 17:00


IMPRESSION:  1.  There are 2 prominent hepatic cysts.


2.  No urinary pathology is seen.


3.  There is diverticulosis coli.


4.  There is considerable fecal material in the left colon and in the rectum 

possibly suggesting constipation.


5.  Lumbar degenerative disc changes.


 














Qualifiers


**PATEINT BEING DISCHARGED WITH ANY OF THE FOLLOWING DIAGNOSIS?: No





Plan


Time Spent: Greater than 30 Minutes

## 2018-01-08 ENCOUNTER — HOSPITAL ENCOUNTER (EMERGENCY)
Dept: HOSPITAL 62 - ER | Age: 73
LOS: 1 days | Discharge: HOME | End: 2018-01-09
Payer: MEDICARE

## 2018-01-08 DIAGNOSIS — R50.9: ICD-10-CM

## 2018-01-08 DIAGNOSIS — N10: Primary | ICD-10-CM

## 2018-01-08 DIAGNOSIS — Z90.710: ICD-10-CM

## 2018-01-08 DIAGNOSIS — I10: ICD-10-CM

## 2018-01-08 DIAGNOSIS — F03.90: ICD-10-CM

## 2018-01-08 DIAGNOSIS — R53.1: ICD-10-CM

## 2018-01-08 LAB
ADD MANUAL DIFF: NO
ALBUMIN SERPL-MCNC: 4.1 G/DL (ref 3.5–5)
ALP SERPL-CCNC: 51 U/L (ref 38–126)
ALT SERPL-CCNC: 15 U/L (ref 9–52)
ANION GAP SERPL CALC-SCNC: 11 MMOL/L (ref 5–19)
AST SERPL-CCNC: 13 U/L (ref 14–36)
BASOPHILS # BLD AUTO: 0 10^3/UL (ref 0–0.2)
BASOPHILS NFR BLD AUTO: 0.5 % (ref 0–2)
BILIRUB DIRECT SERPL-MCNC: 0.2 MG/DL (ref 0–0.4)
BILIRUB SERPL-MCNC: 0.2 MG/DL (ref 0.2–1.3)
BUN SERPL-MCNC: 21 MG/DL (ref 7–20)
CALCIUM: 9.3 MG/DL (ref 8.4–10.2)
CHLORIDE SERPL-SCNC: 102 MMOL/L (ref 98–107)
CO2 SERPL-SCNC: 26 MMOL/L (ref 22–30)
EOSINOPHIL # BLD AUTO: 0.4 10^3/UL (ref 0–0.6)
EOSINOPHIL NFR BLD AUTO: 4.8 % (ref 0–6)
ERYTHROCYTE [DISTWIDTH] IN BLOOD BY AUTOMATED COUNT: 14.2 % (ref 11.5–14)
GLUCOSE SERPL-MCNC: 103 MG/DL (ref 75–110)
HCT VFR BLD CALC: 38.7 % (ref 36–47)
HGB BLD-MCNC: 13 G/DL (ref 12–15.5)
LYMPHOCYTES # BLD AUTO: 1.6 10^3/UL (ref 0.5–4.7)
LYMPHOCYTES NFR BLD AUTO: 17 % (ref 13–45)
MCH RBC QN AUTO: 29.8 PG (ref 27–33.4)
MCHC RBC AUTO-ENTMCNC: 33.5 G/DL (ref 32–36)
MCV RBC AUTO: 89 FL (ref 80–97)
MONOCYTES # BLD AUTO: 1.4 10^3/UL (ref 0.1–1.4)
MONOCYTES NFR BLD AUTO: 15.5 % (ref 3–13)
NEUTROPHILS # BLD AUTO: 5.7 10^3/UL (ref 1.7–8.2)
NEUTS SEG NFR BLD AUTO: 62.2 % (ref 42–78)
PLATELET # BLD: 231 10^3/UL (ref 150–450)
POTASSIUM SERPL-SCNC: 4.5 MMOL/L (ref 3.6–5)
PROT SERPL-MCNC: 7.4 G/DL (ref 6.3–8.2)
RBC # BLD AUTO: 4.36 10^6/UL (ref 3.72–5.28)
SODIUM SERPL-SCNC: 139.3 MMOL/L (ref 137–145)
TOTAL CELLS COUNTED % (AUTO): 100 %
WBC # BLD AUTO: 9.2 10^3/UL (ref 4–10.5)

## 2018-01-08 PROCEDURE — 87088 URINE BACTERIA CULTURE: CPT

## 2018-01-08 PROCEDURE — 71045 X-RAY EXAM CHEST 1 VIEW: CPT

## 2018-01-08 PROCEDURE — 87086 URINE CULTURE/COLONY COUNT: CPT

## 2018-01-08 PROCEDURE — 85025 COMPLETE CBC W/AUTO DIFF WBC: CPT

## 2018-01-08 PROCEDURE — 96365 THER/PROPH/DIAG IV INF INIT: CPT

## 2018-01-08 PROCEDURE — 51701 INSERT BLADDER CATHETER: CPT

## 2018-01-08 PROCEDURE — 87186 SC STD MICRODIL/AGAR DIL: CPT

## 2018-01-08 PROCEDURE — 80053 COMPREHEN METABOLIC PANEL: CPT

## 2018-01-08 PROCEDURE — 99284 EMERGENCY DEPT VISIT MOD MDM: CPT

## 2018-01-08 PROCEDURE — 36415 COLL VENOUS BLD VENIPUNCTURE: CPT

## 2018-01-08 PROCEDURE — 81001 URINALYSIS AUTO W/SCOPE: CPT

## 2018-01-08 NOTE — ER DOCUMENT REPORT
ED Medical Screen (RME)





- General


Chief Complaint: Fever


Stated Complaint: FEVER


Time Seen by Provider: 01/08/18 22:22


Notes: 





Patient is a 72-year-old decreased activity, decreased appetite, family states 

she has been sitting in her chair and has more difficulty getting up, they also 

state she had a low-grade fever earlier today at 100.5.  No vomiting, no 

coughing, no complaints.  Patient has a history of Alzheimer's.  They are 

unable to tell me any other medical history other than history of frequent 

UTIs.  Patient unable to give me any history with her Alzheimer's.


TRAVEL OUTSIDE OF THE [place] IN LAST 30 DAYS: No





- Related Data


Allergies/Adverse Reactions: 


 





No Known Allergies Allergy (Verified 11/09/17 15:12)


 











Past Medical History





- Past Medical History


Cardiac Medical History: Reports: Hx Hypertension


Renal/ Medical History: Denies: Hx Peritoneal Dialysis


GI Medical History: Reports: Hx Gastroesophageal Reflux Disease


Psychiatric Medical History: Reports: Hx Dementia, Hx Depression





- Immunizations


Hx Diphtheria, Pertussis, Tetanus Vaccination: Yes


History of Influenza Vaccine for 10/2017 - 3/2018 Season: Yes





Physical Exam





- Vital signs


Vitals: 





 











Temp Pulse Resp BP Pulse Ox


 


 98.3 F   93   16   90/57 L  95 


 


 01/08/18 21:20  01/08/18 21:20  01/08/18 21:20  01/08/18 21:20  01/08/18 21:20














- General


General appearance: Appears well


In distress: None





Course





- Re-evaluation


Re-evalutation: 


Initially concern for sepsis with shock with hypotension, however the first 

blood pressure was taken through patient's clothing, I took off her sweatshirt 

and took her blood pressure on her arm and her blood pressure is 140s systolic 

with a normal map.  No tachycardia, no current fever, patient is smiling at me.

  Workup pending.








- Vital Signs


Vital signs: 





 











Temp Pulse Resp BP Pulse Ox


 


 98.3 F   93   16   140/83 H  95 


 


 01/08/18 21:20  01/08/18 21:20  01/08/18 21:20  01/08/18 22:27  01/08/18 21:20

## 2018-01-09 VITALS — DIASTOLIC BLOOD PRESSURE: 88 MMHG | SYSTOLIC BLOOD PRESSURE: 139 MMHG

## 2018-01-09 LAB
APPEARANCE UR: (no result)
APTT PPP: YELLOW S
BILIRUB UR QL STRIP: NEGATIVE
GLUCOSE UR STRIP-MCNC: NEGATIVE MG/DL
KETONES UR STRIP-MCNC: NEGATIVE MG/DL
NITRITE UR QL STRIP: NEGATIVE
PH UR STRIP: 5 [PH] (ref 5–9)
PROT UR STRIP-MCNC: 100 MG/DL
SP GR UR STRIP: 1.02
UROBILINOGEN UR-MCNC: 2 MG/DL (ref ?–2)

## 2018-01-09 NOTE — RADIOLOGY REPORT (SQ)
EXAM DESCRIPTION: CHEST SINGLE VIEW



CLINICAL HISTORY: fever



COMPARISON: 11/9/2017



FINDINGS: Single frontal view of the chest.  Atherosclerotic

calcification and tortuosity of thoracic aorta. Heart is not

enlarged. Leads overlie the chest. No consolidation,

pneumothorax, or pleural effusion.  No displaced rib fractures

identified.  Upper abdominal soft tissues are unremarkable.



IMPRESSION:



1. No acute pulmonary process identified.

## 2018-01-09 NOTE — ER DOCUMENT REPORT
ED General





- General


Chief Complaint: Fever


Stated Complaint: FEVER


Time Seen by Provider: 01/08/18 22:22


Cannot obtain history due to: Dementia


Notes: 





Patient is a 72-year-old woman with a past medical history of profound dementia 

who presents with increased generalized weakness relative to baseline.  Patient 

is mute.  Her son at the bedside reports that he notices that she seems to be 

less able to sit up in bed when she tends to get urinary tract infections and 

this is what she has been doing for the past 2 days.  She also spiked a fever 

at home today with to 100.5.  They have not done anything to treat her 

symptoms.  Nothing seems to worsen her symptoms.  She has not had any vomiting, 

diarrhea, or lethargy.


TRAVEL OUTSIDE OF THE [place] IN LAST 30 DAYS: No





- Related Data


Allergies/Adverse Reactions: 


 





No Known Allergies Allergy (Verified 11/09/17 15:12)


 











Past Medical History





- General


Information source: Relative


Cannot obtain history due to: Dementia





- Social History


Smoking Status: Former Smoker


Chew tobacco use (# tins/day): No


Frequency of alcohol use: None


Drug Abuse: None


Lives with: Family


Family History: Reviewed & Not Pertinent


Patient has suicidal ideation: No


Patient has homicidal ideation: No





- Past Medical History


Cardiac Medical History: Reports: Hx Hypertension


Renal/ Medical History: Denies: Hx Peritoneal Dialysis


GI Medical History: Reports: Hx Gastroesophageal Reflux Disease


Psychiatric Medical History: Reports: Hx Dementia, Hx Depression


Past Surgical History: Reports: Hx Hysterectomy, Hx Orthopedic Surgery - carpal 

tunnel





- Immunizations


Hx Diphtheria, Pertussis, Tetanus Vaccination: Yes





Review of Systems





- Review of Systems


-: Yes ROS unobtainable due to patient's medical condition





Physical Exam





- Vital signs


Vitals: 


 











Temp Pulse Resp BP Pulse Ox


 


 98.3 F   93   16   90/57 L  95 


 


 01/08/18 21:20  01/08/18 21:20  01/08/18 21:20  01/08/18 21:20  01/08/18 21:20











Interpretation: Hypotensive


Notes: 





PHYSICAL EXAMINATION:





GENERAL: Appears to be in no discomfort, does not respond to questions





HEAD: Atraumatic, normocephalic.





EYES: Pupils equal round and reactive to light, extraocular movements intact, 

sclera anicteric, conjunctiva are normal.





ENT: nares patent, oropharynx clear without exudates.  Moist mucous membranes.





NECK: Normal range of motion, supple without lymphadenopathy





LUNGS: Breath sounds clear to auscultation bilaterally and equal.  No wheezes 

rales or rhonchi.





HEART: Regular rate and rhythm without murmurs





ABDOMEN: Soft, nontender, normoactive bowel sounds.  No guarding, no rebound.  

No masses appreciated.





EXTREMITIES:  no pitting or edema.  No cyanosis.





NEUROLOGICAL: No focal neurological deficits. Moves all extremities 

spontaneously.





PSYCH: Nonverbal





SKIN: Warm, Dry, normal turgor, no rashes or lesions noted.





Course





- Re-evaluation


Re-evalutation: 





01/09/18 01:26


Patient is a 72-year-old female very demented who presents with concerns of 

increased weakness at home.  Apparently she had a fever to 100.5 at home prior 

to arrival.  On presentation patient is in no acute distress, vitals within 

normal limits.  Initial hypotension did resolve spontaneously.  Labs show no 

acute abnormalities.  No evidence of renal dysfunction or significant 

leukocytosis.  No tachycardia.  Patient does not meet sepsis criteria at this 

time.  IV fluids and IV ceftriaxone will be administered. Family requested 

observation in the hospital. Discussed with  who agrees patient does 

not meet admission criteria at this time.  At this time will discharge with 

return precautions and follow-up recommendations.  Verbal discharge 

instructions given a the bedside and opportunity for questions given. 

Medication warnings reviewed. Son is in agreement with this plan and has 

verbalized understanding of return precautions and the need for primary care 

follow-up in the next 24-72 hours.





- Vital Signs


Vital signs: 


 











Temp Pulse Resp BP Pulse Ox


 


 98.3 F   93   20   139/88 H  94 


 


 01/08/18 21:20  01/08/18 21:20  01/09/18 02:01  01/09/18 02:01  01/09/18 02:01














- Laboratory


Result Diagrams: 


 01/08/18 22:35





 01/08/18 22:35


Laboratory results interpreted by me: 


 











  01/08/18 01/08/18 01/09/18





  22:35 22:35 00:25


 


RDW  14.2 H  


 


Monocytes %  15.5 H  


 


BUN   21 H 


 


AST   13 L 


 


Urine Protein    100 H


 


Urine Blood    LARGE H


 


Urine Urobilinogen    2.0 H


 


Ur Leukocyte Esterase    LARGE H














Discharge





- Discharge


Clinical Impression: 


 Advanced dementia





Urinary tract infection


Qualifiers:


 Urinary tract infection type: acute pyelonephritis Qualified Code(s): N10 - 

Acute pyelonephritis





Condition: Good


Disposition: HOME, SELF-CARE


Additional Instructions: 


You have been diagnosed with a condition called pyelonephritis which is an 

infection involving your kidneys and bladder.  You have been given a dose of 

antibiotics here in the emergency department to help begin to treat this 

infection.  Your also being sent home on antibiotics.  Please start taking 

these later on today when you fill the prescription.  Complete the course even 

if you feel better. Please return if you have persistent vomiting, pass out, 

have worsening pain, become unable to tolerate fluids, or have any other 

symptoms that are concerning to you.  Please follow-up with your primary care 

physician in the next 24-48 hours.


Prescriptions: 


Cephalexin Monohydrate [Keflex 500 mg Capsule] 500 mg PO Q6H 7 Days  capsule


Referrals: 


ALEXIS CHRISTY PA [Primary Care Provider] - Follow up as needed

## 2018-05-01 ENCOUNTER — HOSPITAL ENCOUNTER (INPATIENT)
Dept: HOSPITAL 62 - ER | Age: 73
LOS: 6 days | Discharge: NURSING, REHAB OR LONG-TERM CARE | DRG: 177 | End: 2018-05-07
Attending: INTERNAL MEDICINE | Admitting: INTERNAL MEDICINE
Payer: MEDICARE

## 2018-05-01 DIAGNOSIS — Z66: ICD-10-CM

## 2018-05-01 DIAGNOSIS — I10: ICD-10-CM

## 2018-05-01 DIAGNOSIS — R40.0: ICD-10-CM

## 2018-05-01 DIAGNOSIS — E87.6: ICD-10-CM

## 2018-05-01 DIAGNOSIS — G30.9: ICD-10-CM

## 2018-05-01 DIAGNOSIS — J96.00: ICD-10-CM

## 2018-05-01 DIAGNOSIS — J69.0: Primary | ICD-10-CM

## 2018-05-01 DIAGNOSIS — E78.00: ICD-10-CM

## 2018-05-01 DIAGNOSIS — Z79.82: ICD-10-CM

## 2018-05-01 DIAGNOSIS — K56.41: ICD-10-CM

## 2018-05-01 DIAGNOSIS — F32.9: ICD-10-CM

## 2018-05-01 DIAGNOSIS — K21.9: ICD-10-CM

## 2018-05-01 DIAGNOSIS — E03.9: ICD-10-CM

## 2018-05-01 DIAGNOSIS — F02.80: ICD-10-CM

## 2018-05-01 DIAGNOSIS — Z79.899: ICD-10-CM

## 2018-05-01 DIAGNOSIS — E87.0: ICD-10-CM

## 2018-05-01 DIAGNOSIS — M41.9: ICD-10-CM

## 2018-05-01 DIAGNOSIS — R10.84: ICD-10-CM

## 2018-05-01 DIAGNOSIS — K57.90: ICD-10-CM

## 2018-05-01 DIAGNOSIS — M51.36: ICD-10-CM

## 2018-05-01 LAB
ADD MANUAL DIFF: NO
ALBUMIN SERPL-MCNC: 3.5 G/DL (ref 3.5–5)
ALP SERPL-CCNC: 40 U/L (ref 38–126)
ALT SERPL-CCNC: 15 U/L (ref 9–52)
ANION GAP SERPL CALC-SCNC: 13 MMOL/L (ref 5–19)
APPEARANCE UR: (no result)
APTT PPP: YELLOW S
AST SERPL-CCNC: 20 U/L (ref 14–36)
BASE EXCESS BLDV CALC-SCNC: 3.6 MMOL/L
BASOPHILS # BLD AUTO: 0 10^3/UL (ref 0–0.2)
BASOPHILS NFR BLD AUTO: 0.3 % (ref 0–2)
BILIRUB DIRECT SERPL-MCNC: 0.3 MG/DL (ref 0–0.4)
BILIRUB SERPL-MCNC: 0.3 MG/DL (ref 0.2–1.3)
BILIRUB UR QL STRIP: NEGATIVE
BUN SERPL-MCNC: 19 MG/DL (ref 7–20)
CALCIUM: 9.4 MG/DL (ref 8.4–10.2)
CHLORIDE SERPL-SCNC: 106 MMOL/L (ref 98–107)
CO2 SERPL-SCNC: 31 MMOL/L (ref 22–30)
EOSINOPHIL # BLD AUTO: 0.1 10^3/UL (ref 0–0.6)
EOSINOPHIL NFR BLD AUTO: 0.9 % (ref 0–6)
ERYTHROCYTE [DISTWIDTH] IN BLOOD BY AUTOMATED COUNT: 13.9 % (ref 11.5–14)
GLUCOSE SERPL-MCNC: 123 MG/DL (ref 75–110)
GLUCOSE UR STRIP-MCNC: NEGATIVE MG/DL
HCO3 BLDV-SCNC: 29.2 MMOL/L (ref 20–32)
HCT VFR BLD CALC: 39.1 % (ref 36–47)
HGB BLD-MCNC: 13.4 G/DL (ref 12–15.5)
INR PPP: 0.95
KETONES UR STRIP-MCNC: (no result) MG/DL
LYMPHOCYTES # BLD AUTO: 1.6 10^3/UL (ref 0.5–4.7)
LYMPHOCYTES NFR BLD AUTO: 17.3 % (ref 13–45)
MCH RBC QN AUTO: 30.5 PG (ref 27–33.4)
MCHC RBC AUTO-ENTMCNC: 34.2 G/DL (ref 32–36)
MCV RBC AUTO: 89 FL (ref 80–97)
MONOCYTES # BLD AUTO: 1.4 10^3/UL (ref 0.1–1.4)
MONOCYTES NFR BLD AUTO: 15.3 % (ref 3–13)
NEUTROPHILS # BLD AUTO: 6.1 10^3/UL (ref 1.7–8.2)
NEUTS SEG NFR BLD AUTO: 66.2 % (ref 42–78)
NITRITE UR QL STRIP: NEGATIVE
PCO2 BLDV: 48.4 MMHG (ref 35–63)
PH BLDV: 7.4 [PH] (ref 7.3–7.42)
PH UR STRIP: 5 [PH] (ref 5–9)
PLATELET # BLD: 272 10^3/UL (ref 150–450)
POTASSIUM SERPL-SCNC: 4 MMOL/L (ref 3.6–5)
PROT SERPL-MCNC: 7.1 G/DL (ref 6.3–8.2)
PROT UR STRIP-MCNC: 100 MG/DL
PROTHROMBIN TIME: 13.1 SEC (ref 11.4–15.4)
RBC # BLD AUTO: 4.38 10^6/UL (ref 3.72–5.28)
SODIUM SERPL-SCNC: 150.3 MMOL/L (ref 137–145)
SP GR UR STRIP: 1.02
TOTAL CELLS COUNTED % (AUTO): 100 %
UROBILINOGEN UR-MCNC: 4 MG/DL (ref ?–2)
WBC # BLD AUTO: 9.3 10^3/UL (ref 4–10.5)

## 2018-05-01 PROCEDURE — 51701 INSERT BLADDER CATHETER: CPT

## 2018-05-01 PROCEDURE — 87086 URINE CULTURE/COLONY COUNT: CPT

## 2018-05-01 PROCEDURE — 71045 X-RAY EXAM CHEST 1 VIEW: CPT

## 2018-05-01 PROCEDURE — 74177 CT ABD & PELVIS W/CONTRAST: CPT

## 2018-05-01 PROCEDURE — 87040 BLOOD CULTURE FOR BACTERIA: CPT

## 2018-05-01 PROCEDURE — 85610 PROTHROMBIN TIME: CPT

## 2018-05-01 PROCEDURE — 82962 GLUCOSE BLOOD TEST: CPT

## 2018-05-01 PROCEDURE — 82803 BLOOD GASES ANY COMBINATION: CPT

## 2018-05-01 PROCEDURE — 99285 EMERGENCY DEPT VISIT HI MDM: CPT

## 2018-05-01 PROCEDURE — 74230 X-RAY XM SWLNG FUNCJ C+: CPT

## 2018-05-01 PROCEDURE — 87077 CULTURE AEROBIC IDENTIFY: CPT

## 2018-05-01 PROCEDURE — 80053 COMPREHEN METABOLIC PANEL: CPT

## 2018-05-01 PROCEDURE — 96365 THER/PROPH/DIAG IV INF INIT: CPT

## 2018-05-01 PROCEDURE — 80048 BASIC METABOLIC PNL TOTAL CA: CPT

## 2018-05-01 PROCEDURE — 93010 ELECTROCARDIOGRAM REPORT: CPT

## 2018-05-01 PROCEDURE — 83735 ASSAY OF MAGNESIUM: CPT

## 2018-05-01 PROCEDURE — 83605 ASSAY OF LACTIC ACID: CPT

## 2018-05-01 PROCEDURE — 36415 COLL VENOUS BLD VENIPUNCTURE: CPT

## 2018-05-01 PROCEDURE — 85025 COMPLETE CBC W/AUTO DIFF WBC: CPT

## 2018-05-01 PROCEDURE — 93005 ELECTROCARDIOGRAM TRACING: CPT

## 2018-05-01 PROCEDURE — 81001 URINALYSIS AUTO W/SCOPE: CPT

## 2018-05-01 PROCEDURE — 71046 X-RAY EXAM CHEST 2 VIEWS: CPT

## 2018-05-01 PROCEDURE — 70450 CT HEAD/BRAIN W/O DYE: CPT

## 2018-05-01 NOTE — RADIOLOGY REPORT (SQ)
EXAM DESCRIPTION:  CHEST SINGLE VIEW



COMPLETED DATE/TIME:  5/1/2018 8:03 pm



REASON FOR STUDY:  weakness



COMPARISON:  1/19/2018



EXAM PARAMETERS:  NUMBER OF VIEWS: One view.

TECHNIQUE: Single frontal radiographic view of the chest acquired.

RADIATION DOSE: NA

LIMITATIONS: None.



FINDINGS:  LUNGS AND PLEURA: Mild chronic interstitial changes are suggested.  There is no infiltrate
 or effusion.  There is no mass.

MEDIASTINUM AND HILAR STRUCTURES: No masses.  Contour normal.

HEART AND VASCULAR STRUCTURES: Heart normal in size.  Normal vasculature.

BONES: No acute findings.

HARDWARE: None in the chest.

OTHER: No other significant finding.



IMPRESSION:  Mild chronic lung changes with no acute cardiopulmonary disease.



TECHNICAL DOCUMENTATION:  JOB ID:  [id number]

 2011 [employer]- All Rights Reserved



Reading location - IP/workstation name: [email or web address]

## 2018-05-01 NOTE — RADIOLOGY REPORT (SQ)
EXAM DESCRIPTION:  CHEST SINGLE VIEW



COMPLETED DATE/TIME:  5/1/2018 10:49 pm



REASON FOR STUDY:  aspiration



COMPARISON:  5/1/2018



EXAM PARAMETERS:  NUMBER OF VIEWS: One view.

TECHNIQUE: Single frontal radiographic view of the chest acquired.

RADIATION DOSE: NA

LIMITATIONS: None.



FINDINGS:  LUNGS AND PLEURA: No acute opacities, masses or pneumothorax. No pleural effusion.

MEDIASTINUM AND HILAR STRUCTURES: Stable.

HEART AND VASCULAR STRUCTURES: Stable.

BONES: No acute findings.

HARDWARE: None in the chest.

OTHER: No other significant finding.



IMPRESSION:  NO ACUTE RADIOGRAPHIC FINDING IN THE CHEST.



TECHNICAL DOCUMENTATION:  JOB ID:  [id number]

[id number]

 2011 [employer]- All Rights Reserved



Reading location - IP/workstation name: [email or web address]

## 2018-05-01 NOTE — ER DOCUMENT REPORT
ED General





- General


Stated Complaint: ALTERED MENTAL STATUS


Time Seen by Provider: 05/01/18 19:31


Mode of Arrival: Ambulatory


Information source: Patient


Notes: 





72-year-old female history of dementia presents from care facility with 

concerns for altered mental status, they note that the patient is demented but 

she is not acting at her baseline, no such explanation was provided as to the 

specifics of this confusion


Patient herself denies any complaints however abdominal pain is noted upon 

palpation


TRAVEL OUTSIDE OF THE [place] IN LAST 30 DAYS: No





- HPI


Onset: Just prior to arrival


Onset/Duration: Sudden


Quality of pain: No pain


Severity: Mild


Pain Level: Denies


Associated symptoms: Other


Exacerbated by: Denies


Relieved by: Denies


Similar symptoms previously: No


Recently seen / treated by doctor: No





- Related Data


Allergies/Adverse Reactions: 


 





No Known Allergies Allergy (Verified 11/09/17 15:12)


 











Past Medical History





- Social History


Smoking Status: Never Smoker


Cigarette use (# per day): No


Chew tobacco use (# tins/day): No


Smoking Education Provided: No


Family History: Reviewed & Not Pertinent





- Past Medical History


Cardiac Medical History: Reports: Hx Hypertension


Renal/ Medical History: Denies: Hx Peritoneal Dialysis


GI Medical History: Reports: Hx Gastroesophageal Reflux Disease


Psychiatric Medical History: Reports: Hx Dementia, Hx Depression


Past Surgical History: Reports: Hx Hysterectomy, Hx Orthopedic Surgery - carpal 

tunnel





- Immunizations


Hx Diphtheria, Pertussis, Tetanus Vaccination: Yes





Review of Systems





- Review of Systems


Notes: 





REVIEW OF SYSTEMS:


CONSTITUTIONAL :  Denies fever,  chills, or sweats.  Denies recent illness.


EENT:   Denies eye, ear, throat, or mouth pain or symptoms.  Denies nasal or 

sinus congestion or discharge.  Denies throat, tongue, or mouth swelling or 

difficulty swallowing.


CARDIOVASCULAR:  Denies chest pain.  Denies palpitations or racing or irregular 

heart beat.  Denies ankle edema.


RESPIRATORY:  Denies cough, cold, or chest congestion.  Denies shortness of 

breath, difficulty breathing, or wheezing.


GASTROINTESTINAL:  Denies abdominal pain or distention.  Denies nausea, vomiting

, or diarrhea.  Denies blood in vomitus, stools, or per rectum.  Denies black, 

tarry stools.  Denies constipation. 


GENITOURINARY:  Denies difficulty urinating, painful urination, burning, 

frequency, blood in urine, or discharge.


FEMALE  GENITOURINARY:  Denies vaginal bleeding, heavy or abnormal periods, 

irregular periods.  Denies vaginal discharge or odor. 


MUSCULOSKELETAL:  Denies back or neck pain or stiffness.  Denies joint pain or 

swelling.


SKIN:   Denies rash, lesions or sores.


HEMATOLOGIC :   Denies easy bruising or bleeding.


LYMPHATIC:  Denies swollen, enlarged glands.


NEUROLOGICAL: Confusion per facility 


PSYCHIATRIC:  Denies anxiety or stress.  Denies depression, suicidal ideation, 

or homicidal ideation.





ALL OTHER SYSTEMS REVIEWED AND NEGATIVE.











PHYSICAL EXAMINATION:





GENERAL: Well-appearing, well-nourished and in no acute distress.





HEAD: Atraumatic, normocephalic.





EYES: Pupils equal round and reactive to light, extraocular movements intact, 

conjunctiva are normal.





ENT: Nares patent, oropharynx clear without exudates.  Moist mucous membranes.





NECK: Normal range of motion, supple without lymphadenopathy





LUNGS: Breath sounds clear to auscultation bilaterally and equal.  No wheezes 

rales or rhonchi.





HEART: Mild tachycardia noted





ABDOMEN: firm, probable constipation 





Female : deferred





Musculoskeletal: Normal range of motion, no pitting or edema.  No cyanosis.





NEUROLOGICAL: Cranial nerves grossly intact.  Normal speech, normal gait.  

Normal sensory, motor exams





PSYCH: Normal mood, normal affect.





SKIN: Warm, Dry, normal turgor, no rashes or lesions noted.

















Dictation was performed using Dragon voice recognition software





Physical Exam





- Vital signs


Vitals: 


 











Resp Pulse Ox


 


 22 H  96 


 


 05/01/18 19:48  05/01/18 19:48














Course





- Re-evaluation


Re-evalutation: 





05/01/18 19:53


Patient's presentation is a bit confusing as she is demented at baseline, she 

is noted to be mildly tachycardic abdomen is firm but not distended I believe 

it is more constipation related when we attempted to get a temperature rectally 

large amount stool was noted at the rectum as well CT is pending lab work as 

well


05/01/18 23:14


After fecal decompression was performed, patient was stable, I was preparing 

her discharge when the nurse alerted me that the patient became hypoxic and 

they believe that she may have vomited and aspirated, chest x-ray performed 

does not note any significant abnormalities however given the crackles at the 

right base now and the requirement for oxygenation I will start Unasyn and 

admitted to the hospitalist service





- Vital Signs


Vital signs: 


 











Temp Pulse Resp BP Pulse Ox


 


       23 H  155/86 H  97 


 


       05/01/18 21:00  05/01/18 20:01  05/01/18 20:01














- Laboratory


Result Diagrams: 


 05/01/18 19:55





 05/01/18 19:55


Laboratory results interpreted by me: 


 











  05/01/18 05/01/18 05/01/18





  19:42 19:55 19:55


 


Monocytes %   15.3 H 


 


Sodium    150.3 H


 


Carbon Dioxide    31 H


 


Glucose    123 H


 


POC Glucose  126 H  


 


Urine Protein   


 


Urine Ketones   


 


Urine Blood   


 


Urine Urobilinogen   














  05/01/18





  20:42


 


Monocytes % 


 


Sodium 


 


Carbon Dioxide 


 


Glucose 


 


POC Glucose 


 


Urine Protein  100 H


 


Urine Ketones  TRACE H


 


Urine Blood  LARGE H


 


Urine Urobilinogen  4.0 H














- Diagnostic Test


Radiology reviewed: Image reviewed, Reports reviewed





Procedures





- Additional Procedures


  ** fecal decompaction 


Time performed: 22:00 - large amount of hard stool 





Discharge





- Discharge


Clinical Impression: 


 Fecal impaction





Aspiration pneumonia


Qualifiers:


 Aspiration pneumonia type: unspecified Laterality: right Lung location: lower 

lobe of lung Qualified Code(s): J69.0 - Pneumonitis due to inhalation of food 

and vomit





Condition: Stable


Disposition: HOME, SELF-CARE


Referrals: 


ALEXIS CHRISTY PA [Primary Care Provider] - Follow up in 3-5 days

## 2018-05-01 NOTE — RADIOLOGY REPORT (SQ)
EXAM DESCRIPTION:  CT ABD/PELVIS WITH IV ONLY



COMPLETED DATE/TIME:  5/1/2018 9:38 pm



REASON FOR STUDY:  abd pain



COMPARISON:  CT renal stone on 11/9/2017



TECHNIQUE:  CT scan of the abdomen and pelvis performed using helical scanning technique with dynamic
 intravenous contrast injection.  No oral contrast. Images reviewed with lung, soft tissue, and bone 
windows. Reconstructed coronal and sagittal MPR images reviewed. Delayed images for evaluation of the
 urinary system also acquired. All images stored on PACS.

All CT scanners at this facility use dose modulation, iterative reconstruction, and/or weight based d
osing when appropriate to reduce radiation dose to as low as reasonably achievable (ALARA).

[facility]: Dose Right  [facility]: CareDose    [facility]: Dose Right    [facility]: Teradose 4D    [facility]: Smart Technologies



CONTRAST TYPE AND DOSE:  contrast/concentration: Isovue 370.00 mg/ml; Total Contrast Delivered: 100.0
 ml; Total Saline Delivered: 40.0 ml



RENAL FUNCTION:  BUN 19 creatinine 0.55



RADIATION DOSE:  CT Rad equipment meets quality standard of care and radiation dose reduction techniq
ues were employed. CTDIvol: 10.1 mGy. DLP: 1061 mGy-cm..



LIMITATIONS:  None.



FINDINGS:  LOWER CHEST: No significant findings. No nodules or infiltrates.

LIVER: Stable hepatic cysts.  Mild intrahepatic ductal dilatation.

SPLEEN: Normal size. No focal lesions.

PANCREAS: No masses. No significant calcifications. No adjacent inflammation or peripancreatic fluid 
collections. Pancreatic duct not dilated.

GALLBLADDER: Not identified.  The mild ductal dilatation suggests possibility of prior cholecystitis.
  Surgical clips are not seen, however.

ADRENAL GLANDS: There is mild thickening of both adrenal glands that is stable.

RIGHT KIDNEY AND URETER: No solid masses.   No significant calcifications.   No hydronephrosis or hyd
roureter.

LEFT KIDNEY AND URETER: No solid masses.   No significant calcifications.   No hydronephrosis or hydr
oureter.

AORTA AND VESSELS: No aneurysm. No dissection. Renal arteries, SMA, celiac without stenosis.

RETROPERITONEUM: No retroperitoneal adenopathy, hemorrhage or masses.

BOWEL AND PERITONEAL CAVITY: No masses.  Numerous descending and sigmoid diverticula are present.  Th
ere is considerable stool.  There is a large amount of stool in the rectum.

APPENDIX: Not identified.

PELVIS: No mass.  No free fluid. Normal bladder.

ABDOMINAL WALL: No masses. No hernias.

BONES: L4-5 degenerative disc changes.  No acute osseous abnormality.  Scoliosis.

OTHER: No other significant finding.



IMPRESSION:  1.  Stable hepatic cysts.

2.  Mild ductal dilatation suggesting prior cholecystectomy.  Surgical clips are not seen, however.

3.  Thickening of both adrenal glands that appears stable.

4.  Extensive diverticulosis coli with no acute inflammatory changes.

5.  Considerable stool.  A large amount of stool in the rectum raises the possibility of fecal impact
ion.

6.  Scoliosis and mild lumbar degenerative disc changes.



TECHNICAL DOCUMENTATION:  JOB ID:  [id number]

Quality ID # [id number]: Final reports with documentation of one or more dose reduction techniques (e.g., Au
tomated exposure control, adjustment of the mA and/or kV according to patient size, use of iterative 
reconstruction technique)

 2011 [employer]- All Rights Reserved



Reading location - IP/workstation name: [email or web address]

## 2018-05-02 RX ADMIN — LANSOPRAZOLE SCH MG: 30 TABLET, ORALLY DISINTEGRATING, DELAYED RELEASE ORAL at 06:18

## 2018-05-02 RX ADMIN — LEVETIRACETAM SCH ML: 5 INJECTION INTRAVENOUS at 17:19

## 2018-05-02 RX ADMIN — CLINDAMYCIN PHOSPHATE SCH ML: 12 INJECTION, SOLUTION INTRAVENOUS at 06:16

## 2018-05-02 RX ADMIN — DEXTROSE PRN ML: 5 SOLUTION INTRAVENOUS at 17:06

## 2018-05-02 RX ADMIN — SENNOSIDES, DOCUSATE SODIUM SCH EACH: 50; 8.6 TABLET, FILM COATED ORAL at 22:06

## 2018-05-02 RX ADMIN — METOCLOPRAMIDE SCH MG: 5 INJECTION, SOLUTION INTRAMUSCULAR; INTRAVENOUS at 17:19

## 2018-05-02 RX ADMIN — PIPERACILLIN AND TAZOBACTAM SCH GM: 3; .375 INJECTION, POWDER, LYOPHILIZED, FOR SOLUTION INTRAVENOUS; PARENTERAL at 18:42

## 2018-05-02 RX ADMIN — ENOXAPARIN SODIUM SCH MG: 40 INJECTION SUBCUTANEOUS at 13:19

## 2018-05-02 RX ADMIN — CLINDAMYCIN PHOSPHATE SCH ML: 12 INJECTION, SOLUTION INTRAVENOUS at 13:19

## 2018-05-02 RX ADMIN — DIVALPROEX SODIUM SCH MG: 250 TABLET, FILM COATED, EXTENDED RELEASE ORAL at 22:06

## 2018-05-02 RX ADMIN — PIPERACILLIN AND TAZOBACTAM SCH GM: 3; .375 INJECTION, POWDER, LYOPHILIZED, FOR SOLUTION INTRAVENOUS; PARENTERAL at 23:03

## 2018-05-02 RX ADMIN — METOCLOPRAMIDE SCH MG: 5 INJECTION, SOLUTION INTRAMUSCULAR; INTRAVENOUS at 23:03

## 2018-05-02 NOTE — EKG REPORT
SEVERITY:- ABNORMAL ECG -

SINUS TACHYCARDIA

CONSIDER LEFT VENTRICULAR HYPERTROPHY

:

Confirmed by: Noman Braga 02-May-2018 09:08:45

## 2018-05-02 NOTE — PDOC H&P
History of Present Illness


Admission Date/PCP: 


  





  ARNOLDO JIMÉNEZ





History of Present Illness: 


JENNIFER ISLAS is a 72 year old  female patient who is a nursing home 

resident brought with chief complaint of altered mental status.  Since patient 

has underlying advanced dementia and cognitive impairment is not source of 

history.  I got brief history from the ER attending notes.  As per ER attending 

note patient is not acting at her baseline and no such explanation was provided 

as to the specifics of confusion.  Her CT of abdomen revealed extensive 

diverticulosis without diverticulitis and fecal impaction.  After ER attending 

disimpacted her, patient has an episode of vomiting and possibly aspiration.  

Patient desaturated to lower 80s.  Further detailed history could not be 

obtained.





Past Medical History


Cardiac Medical History: Reports: Hyperlipidema, Hypertension


GI Medical History: Reports: Gastroesophageal Reflux Disease


Psychiatric Medical History: Reports: Dementia, Depression





Past Surgical History


Past Surgical History: Reports: Hysterectomy, Orthopedic Surgery - carpal tunnel





Social History


Smoking Status: Never Smoker


Frequency of Alcohol Use: None


Hx Recreational Drug Use: No


Drugs: None


Hx Prescription Drug Abuse: No





Family History


Family History: Reviewed & Not Pertinent


Parental Family History Reviewed: No


Children Family History Reviewed: No


Sibling(s) Family History Reviewed.: No - Cognitive impairment





Medication/Allergy


Home Medications: 








Divalproex Sodium [Divalproex Sodium ER] 750 mg PO QHS 11/09/17 


Ferrous Sulfate [Feosol 325 mg Tablet] 325 mg PO [id number] 11/09/17 


Levothyroxine Sodium [Synthroid 0.1 mg Tablet] 0.1 mg PO DAILY 11/09/17 


Lorazepam [Ativan 1 mg Tablet] 1 mg PO Q12HP PRN 11/09/17 


Memantine HCl [Namenda Xr] 28 mg PO DAILY 11/09/17 


Omeprazole 20 mg PO DAILY 11/09/17 


Pravastatin Sodium [Pravachol] 40 mg PO DAILY 11/09/17 


Quetiapine Fumarate [Seroquel 25 mg Tablet] 25 mg PO QHS 11/09/17 


Rivastigmine Tartrate [Rivastigmine] 3 mg PO Q12 11/09/17 


Sertraline HCl [Zoloft] 150 mg PO DAILY 11/09/17 


Sucralfate [Carafate 1 gm Tablet] 1 gm PO Q12 11/09/17 


Tramadol HCl [Ultram 50 mg Tablet] 50 mg PO Q12HP PRN 11/09/17 


Levofloxacin [Levaquin 500 mg Tablet] 500 mg PO DAILY #1 tablet 11/12/17 


Lisinopril 5 mg PO DAILY #30 tablet 11/12/17 


Quetiapine Fumarate [Seroquel 25 mg Tablet] 25 mg PO QHS  tablet 11/12/17 


Cephalexin Monohydrate [Keflex 500 mg Capsule] 500 mg PO Q6H 7 Days  capsule 01/ 09/18 








Allergies/Adverse Reactions: 


 





No Known Allergies Allergy (Verified 11/09/17 15:12)


 











Review of Systems


ROS unobtainable: Due to mental status





Physical Exam


Vital Signs: 


 











Temp Pulse Resp BP Pulse Ox


 


       23 H  155/86 H  97 


 


       05/01/18 21:00  05/01/18 20:01  05/01/18 20:01








 Intake & Output











 04/30/18 05/01/18 05/02/18





 06:59 06:59 06:59


 


Weight   56.3 kg











General appearance: PRESENT: mild distress


Head exam: PRESENT: atraumatic, normocephalic


Respiratory exam: PRESENT: crackles


Cardiovascular exam: PRESENT: tachycardia





Results


Laboratory Results: 


 





 05/01/18 19:55 





 05/01/18 19:55 





 











  05/01/18 05/01/18 05/01/18





  19:55 19:55 19:55


 


WBC  9.3  


 


RBC  4.38  


 


Hgb  13.4  


 


Hct  39.1  


 


MCV  89  


 


MCH  30.5  


 


MCHC  34.2  


 


RDW  13.9  


 


Plt Count  272  


 


Seg Neutrophils %  66.2  


 


Lymphocytes %  17.3  


 


Monocytes %  15.3 H  


 


Eosinophils %  0.9  


 


Basophils %  0.3  


 


Absolute Neutrophils  6.1  


 


Absolute Lymphocytes  1.6  


 


Absolute Monocytes  1.4  


 


Absolute Eosinophils  0.1  


 


Absolute Basophils  0.0  


 


VBG pH   


 


VBG pCO2   


 


VBG HCO3   


 


VBG Base Excess   


 


Sodium   150.3 H 


 


Potassium   4.0 


 


Chloride   106 


 


Carbon Dioxide   31 H 


 


Anion Gap   13 


 


BUN   19 


 


Creatinine   0.55 


 


Est GFR ( Amer)   > 60 


 


Est GFR (Non-Af Amer)   > 60 


 


Glucose   123 H 


 


Lactic Acid    2.1


 


Calcium   9.4 


 


Total Bilirubin   0.3 


 


AST   20 


 


ALT   15 


 


Alkaline Phosphatase   40 


 


Total Protein   7.1 


 


Albumin   3.5 


 


Urine Color   


 


Urine Appearance   


 


Urine pH   


 


Ur Specific Gravity   


 


Urine Protein   


 


Urine Glucose (UA)   


 


Urine Ketones   


 


Urine Blood   


 


Urine Nitrite   


 


Ur Leukocyte Esterase   


 


Urine WBC (Auto)   


 


Urine RBC (Auto)   














  05/01/18 05/01/18 05/01/18





  19:55 20:42 23:51


 


WBC   


 


RBC   


 


Hgb   


 


Hct   


 


MCV   


 


MCH   


 


MCHC   


 


RDW   


 


Plt Count   


 


Seg Neutrophils %   


 


Lymphocytes %   


 


Monocytes %   


 


Eosinophils %   


 


Basophils %   


 


Absolute Neutrophils   


 


Absolute Lymphocytes   


 


Absolute Monocytes   


 


Absolute Eosinophils   


 


Absolute Basophils   


 


VBG pH  7.40  


 


VBG pCO2  48.4  


 


VBG HCO3  29.2  


 


VBG Base Excess  3.6  


 


Sodium   


 


Potassium   


 


Chloride   


 


Carbon Dioxide   


 


Anion Gap   


 


BUN   


 


Creatinine   


 


Est GFR ( Amer)   


 


Est GFR (Non-Af Amer)   


 


Glucose   


 


Lactic Acid    0.8


 


Calcium   


 


Total Bilirubin   


 


AST   


 


ALT   


 


Alkaline Phosphatase   


 


Total Protein   


 


Albumin   


 


Urine Color   YELLOW 


 


Urine Appearance   SLIGHTLY-CLOUDY 


 


Urine pH   5.0 


 


Ur Specific Gravity   1.023 


 


Urine Protein   100 H 


 


Urine Glucose (UA)   NEGATIVE 


 


Urine Ketones   TRACE H 


 


Urine Blood   LARGE H 


 


Urine Nitrite   NEGATIVE 


 


Ur Leukocyte Esterase   NEGATIVE 


 


Urine WBC (Auto)   5 


 


Urine RBC (Auto)   >182 











Impressions: 


 





Abdomen/Pelvis CT  05/01/18 19:46


IMPRESSION:  1.  Stable hepatic cysts.


2.  Mild ductal dilatation suggesting prior cholecystectomy.  Surgical clips 

are not seen, however.


3.  Thickening of both adrenal glands that appears stable.


4.  Extensive diverticulosis coli with no acute inflammatory changes.


5.  Considerable stool.  A large amount of stool in the rectum raises the 

possibility of fecal impaction.


6.  Scoliosis and mild lumbar degenerative disc changes.


 








Chest X-Ray  05/01/18 22:24


IMPRESSION:  NO ACUTE RADIOGRAPHIC FINDING IN THE CHEST.


 














Assessment & Plan





- Diagnosis


(1) Aspiration pneumonia


Qualifiers: 


   Aspiration pneumonia type: due to vomit   Laterality: right   Lung location: 

lower lobe of lung   Qualified Code(s): J69.0 - Pneumonitis due to inhalation 

of food and vomit   


Is this a current diagnosis for this admission?: Yes   


Plan: 


Patient has been started on clindamycin.








(2) Hyperlipidemia


Qualifiers: 


   Hyperlipidemia type: unspecified   Qualified Code(s): E78.5 - Hyperlipidemia

, unspecified   


Is this a current diagnosis for this admission?: Yes   


Plan: 


We will hold it until she is evaluated by speech therapist








(3) Hypothyroidism


Qualifiers: 


   Hypothyroidism type: acquired   Qualified Code(s): E03.9 - Hypothyroidism, 

unspecified   


Is this a current diagnosis for this admission?: Yes   


Plan: 


I will change to IV Synthroid








- Time


Time Spent: 30 to 50 Minutes





- Inpatient Certification


Medical Necessity: Need for IV Antibiotics

## 2018-05-02 NOTE — PDOC PROGRESS REPORT
Subjective


Progress Note for:: 05/02/18


Subjective:: 





And patient very somnolent very difficult to arouse.  Nurse reported the patient

's son called and notified that patient had been at the [facility] facility for 10 

days and that she suffers from pneumonia.








Review of systems


Unable to obtain due to patient's mental status





All significant diagnostics and laboratories have been reviewed


Reason For Visit: 


ASPIRATION PNEUMONIA, CONSTIPATION








Physical Exam


Vital Signs: 


 











Temp Pulse Resp BP Pulse Ox


 


       15   101/83   99 


 


       05/02/18 05:09  05/02/18 05:09  05/02/18 05:09








 Intake & Output











 05/01/18 05/02/18 05/03/18





 06:59 06:59 06:59


 


Weight  65.2 kg 











General appearance: PRESENT: other - Patient difficult to arouse


Head exam: PRESENT: atraumatic, normocephalic


Eye exam: PRESENT: conjunctiva pink, EOMI, PERRLA


Mouth exam: PRESENT: moist


Neck exam: ABSENT: JVD, lymphadenopathy, tenderness


Respiratory exam: PRESENT: clear to auscultation jose


Cardiovascular exam: PRESENT: RRR.  ABSENT: diastolic murmur, systolic murmur


GI/Abdominal exam: PRESENT: normal bowel sounds, soft, tenderness


Extremities exam: ABSENT: pedal edema


Musculoskeletal exam: ABSENT: ambulatory





Results


Impressions: 


 





Abdomen/Pelvis CT  05/01/18 19:46


IMPRESSION:  1.  Stable hepatic cysts.


2.  Mild ductal dilatation suggesting prior cholecystectomy.  Surgical clips 

are not seen, however.


3.  Thickening of both adrenal glands that appears stable.


4.  Extensive diverticulosis coli with no acute inflammatory changes.


5.  Considerable stool.  A large amount of stool in the rectum raises the 

possibility of fecal impaction.


6.  Scoliosis and mild lumbar degenerative disc changes.


 








Chest X-Ray  05/01/18 22:24


IMPRESSION:  NO ACUTE RADIOGRAPHIC FINDING IN THE CHEST.


 














Assessment & Plan





- Diagnosis


(1) Hypernatremia


Is this a current diagnosis for this admission?: Yes   


Plan: 


To place patient on D5 water and trend sodium








(2) Aspiration pneumonia


Qualifiers: 


   Aspiration pneumonia type: due to vomit   Laterality: right   Lung location: 

lower lobe of lung   Qualified Code(s): J69.0 - Pneumonitis due to inhalation 

of food and vomit   


Is this a current diagnosis for this admission?: Yes   


Plan: 


Change to Zosyn to cover gram-negative bacilli








(3) Fecal impaction


Is this a current diagnosis for this admission?: Yes   


Plan: 


Disimpacted while in emergency room on admission








(4) Hypothyroidism


Qualifiers: 


   Hypothyroidism type: acquired   Qualified Code(s): E03.9 - Hypothyroidism, 

unspecified   


Is this a current diagnosis for this admission?: Yes   


Plan: 


Continue outpatient regimen








(5) Dementia


Qualifiers: 


   Alzheimer's disease onset: unspecified onset   Dementia behavioral 

disturbance: without behavioral disturbance 


Is this a current diagnosis for this admission?: Yes   


Plan: 


Supportive care








(6) Altered mental status


Qualifiers: 


   Altered mental status type: somnolence   Qualified Code(s): R40.0 - 

Somnolence   


Is this a current diagnosis for this admission?: Yes   


Plan: 


Order CT of the brain without contrast








(7) Abdominal pain


Qualifiers: 


   Abdominal location: generalized   Qualified Code(s): R10.84 - Generalized 

abdominal pain   


Is this a current diagnosis for this admission?: Yes   


Plan: 


Patient grimacing on auscultation. May be contributing to presentation.  

Results of CT of the abdomen and pelvis noted. Order morphine IV for pain.  Was 

more awake will order a bowel regimen








- Time


Time Spent with patient: 15-24 minutes


Medications reviewed and adjusted accordingly: Yes


Anticipated discharge: SNF


Within: within 72 hours





- Inpatient Certification


Based on my medical assessment, after consideration of the patient's 

comorbidities, presenting symptoms, or acuity I expect that the services needed 

warrant INPATIENT care.: Yes


I certify that my determination is in accordance with my understanding of 

Medicare's requirements for reasonable and necessary INPATIENT services [42 CFR 

412.3e].: Yes


Medical Necessity: Need Close Monitoring Due to Risk of Patient Decompensation, 

Need For IV Fluids, Need for IV Antibiotics

## 2018-05-02 NOTE — RADIOLOGY REPORT (SQ)
EXAM DESCRIPTION:  CT HEAD WITHOUT



COMPLETED DATE/TIME:  5/2/2018 7:03 pm



REASON FOR STUDY:  AMS



COMPARISON:  9/17/2017



TECHNIQUE:  Axial images acquired through the brain without intravenous contrast.  Images reviewed wi
th bone, brain and subdural windows.  Additional sagittal and coronal reconstructions were generated.
 Images stored on PACS.

All CT scanners at this facility use dose modulation, iterative reconstruction, and/or weight based d
osing when appropriate to reduce radiation dose to as low as reasonably achievable (ALARA).

[facility]: Dose Right  [facility]: CareDose    [facility]: Dose Right    [facility]: Teradose 4D    [facility]: Smart Technologies



RADIATION DOSE:  CT Rad equipment meets quality standard of care and radiation dose reduction techniq
ues were employed. CTDIvol: 53.2 mGy. DLP: 937 mGy-cm. mGy.



LIMITATIONS:  None.



FINDINGS:  VENTRICLES: Prominent ventricles secondary to involutional atrophy.

CEREBRUM: Cortical atrophy.  No masses.  No hemorrhage.  No midline shift.  No evidence for acute inf
arction. Areas of low density in the white matter most likely chronic small vessel ischemic changes.

CEREBELLUM: No masses.  No hemorrhage.  No alteration of density.  No evidence for acute infarction.

EXTRAAXIAL SPACES: No fluid collections.  No masses.

ORBITS AND GLOBE: No intra- or extraconal masses.  Normal contour of globe without masses.

CALVARIUM: No fracture.

PARANASAL SINUSES: No fluid or mucosal thickening.

SOFT TISSUES: No mass or hematoma.

OTHER: No other significant finding.



IMPRESSION:  MICROVASCULAR ISCHEMIA AND GENERALIZED ATROPHY. NO ACUTE IMAGING FINDINGS IN THE BRAIN

EVIDENCE OF ACUTE STROKE: NO.



COMMENT:  Quality ID # 436: Final reports with documentation of one or more dose reduction techniques
 (e.g., Automated exposure control, adjustment of the mA and/or kV according to patient size, use of 
iterative reconstruction technique)



TECHNICAL DOCUMENTATION:  JOB ID:  [id number]

9/17/2017 2011 [employer]- All Rights Reserved



Reading location - IP/workstation name: [email or web address]

## 2018-05-03 LAB
ADD MANUAL DIFF: NO
ANION GAP SERPL CALC-SCNC: 6 MMOL/L (ref 5–19)
ANION GAP SERPL CALC-SCNC: 9 MMOL/L (ref 5–19)
BASOPHILS # BLD AUTO: 0 10^3/UL (ref 0–0.2)
BASOPHILS NFR BLD AUTO: 0.7 % (ref 0–2)
BUN SERPL-MCNC: 10 MG/DL (ref 7–20)
BUN SERPL-MCNC: 9 MG/DL (ref 7–20)
CALCIUM: 8.2 MG/DL (ref 8.4–10.2)
CALCIUM: 8.8 MG/DL (ref 8.4–10.2)
CHLORIDE SERPL-SCNC: 106 MMOL/L (ref 98–107)
CHLORIDE SERPL-SCNC: 108 MMOL/L (ref 98–107)
CO2 SERPL-SCNC: 33 MMOL/L (ref 22–30)
CO2 SERPL-SCNC: 34 MMOL/L (ref 22–30)
EOSINOPHIL # BLD AUTO: 0.1 10^3/UL (ref 0–0.6)
EOSINOPHIL NFR BLD AUTO: 1.9 % (ref 0–6)
ERYTHROCYTE [DISTWIDTH] IN BLOOD BY AUTOMATED COUNT: 13.7 % (ref 11.5–14)
GLUCOSE SERPL-MCNC: 93 MG/DL (ref 75–110)
GLUCOSE SERPL-MCNC: 97 MG/DL (ref 75–110)
HCT VFR BLD CALC: 32.1 % (ref 36–47)
HGB BLD-MCNC: 11.3 G/DL (ref 12–15.5)
LYMPHOCYTES # BLD AUTO: 2 10^3/UL (ref 0.5–4.7)
LYMPHOCYTES NFR BLD AUTO: 35.2 % (ref 13–45)
MCH RBC QN AUTO: 30.6 PG (ref 27–33.4)
MCHC RBC AUTO-ENTMCNC: 35.2 G/DL (ref 32–36)
MCV RBC AUTO: 87 FL (ref 80–97)
MONOCYTES # BLD AUTO: 0.6 10^3/UL (ref 0.1–1.4)
MONOCYTES NFR BLD AUTO: 11.6 % (ref 3–13)
NEUTROPHILS # BLD AUTO: 2.8 10^3/UL (ref 1.7–8.2)
NEUTS SEG NFR BLD AUTO: 50.6 % (ref 42–78)
PLATELET # BLD: 214 10^3/UL (ref 150–450)
POTASSIUM SERPL-SCNC: 3.1 MMOL/L (ref 3.6–5)
POTASSIUM SERPL-SCNC: 3.5 MMOL/L (ref 3.6–5)
RBC # BLD AUTO: 3.7 10^6/UL (ref 3.72–5.28)
SODIUM SERPL-SCNC: 147.7 MMOL/L (ref 137–145)
SODIUM SERPL-SCNC: 148.4 MMOL/L (ref 137–145)
TOTAL CELLS COUNTED % (AUTO): 100 %
WBC # BLD AUTO: 5.6 10^3/UL (ref 4–10.5)

## 2018-05-03 RX ADMIN — DEXTROSE PRN ML: 5 SOLUTION INTRAVENOUS at 02:58

## 2018-05-03 RX ADMIN — LEVOTHYROXINE SODIUM SCH MG: 100 TABLET ORAL at 05:16

## 2018-05-03 RX ADMIN — DEXTROSE, SODIUM CHLORIDE, AND POTASSIUM CHLORIDE PRN ML: 5; .45; .3 INJECTION INTRAVENOUS at 10:37

## 2018-05-03 RX ADMIN — ASPIRIN 325 MG ORAL TABLET SCH MG: 325 PILL ORAL at 10:42

## 2018-05-03 RX ADMIN — ENOXAPARIN SODIUM SCH MG: 40 INJECTION SUBCUTANEOUS at 10:43

## 2018-05-03 RX ADMIN — RIVASTIGMINE TARTRATE SCH MG: 1.5 CAPSULE ORAL at 16:49

## 2018-05-03 RX ADMIN — ATORVASTATIN CALCIUM SCH MG: 10 TABLET, FILM COATED ORAL at 22:11

## 2018-05-03 RX ADMIN — PIPERACILLIN AND TAZOBACTAM SCH GM: 3; .375 INJECTION, POWDER, LYOPHILIZED, FOR SOLUTION INTRAVENOUS; PARENTERAL at 12:24

## 2018-05-03 RX ADMIN — LEVETIRACETAM SCH ML: 5 INJECTION INTRAVENOUS at 05:15

## 2018-05-03 RX ADMIN — AMOXICILLIN AND CLAVULANATE POTASSIUM SCH TAB: 500; 125 TABLET, FILM COATED ORAL at 22:11

## 2018-05-03 RX ADMIN — METOCLOPRAMIDE SCH MG: 5 INJECTION, SOLUTION INTRAMUSCULAR; INTRAVENOUS at 23:50

## 2018-05-03 RX ADMIN — DIVALPROEX SODIUM SCH MG: 250 TABLET, FILM COATED, EXTENDED RELEASE ORAL at 22:11

## 2018-05-03 RX ADMIN — LEVETIRACETAM SCH ML: 5 INJECTION INTRAVENOUS at 17:14

## 2018-05-03 RX ADMIN — SENNOSIDES, DOCUSATE SODIUM SCH EACH: 50; 8.6 TABLET, FILM COATED ORAL at 22:12

## 2018-05-03 RX ADMIN — DEXTROSE, SODIUM CHLORIDE, AND POTASSIUM CHLORIDE PRN ML: 5; .45; .3 INJECTION INTRAVENOUS at 22:12

## 2018-05-03 RX ADMIN — PIPERACILLIN AND TAZOBACTAM SCH GM: 3; .375 INJECTION, POWDER, LYOPHILIZED, FOR SOLUTION INTRAVENOUS; PARENTERAL at 05:46

## 2018-05-03 RX ADMIN — CALCIUM CARBONATE-CHOLECALCIFEROL TAB 250 MG-125 UNIT SCH TAB: 250-125 TAB at 10:42

## 2018-05-03 RX ADMIN — METOCLOPRAMIDE SCH MG: 5 INJECTION, SOLUTION INTRAMUSCULAR; INTRAVENOUS at 05:16

## 2018-05-03 RX ADMIN — METOCLOPRAMIDE SCH MG: 5 INJECTION, SOLUTION INTRAMUSCULAR; INTRAVENOUS at 17:15

## 2018-05-03 RX ADMIN — METOCLOPRAMIDE SCH MG: 5 INJECTION, SOLUTION INTRAMUSCULAR; INTRAVENOUS at 12:25

## 2018-05-03 RX ADMIN — LANSOPRAZOLE SCH MG: 30 TABLET, ORALLY DISINTEGRATING, DELAYED RELEASE ORAL at 05:16

## 2018-05-03 NOTE — RADIOLOGY REPORT (SQ)
EXAM DESCRIPTION:  COOKIE SWALLOW



COMPLETED DATE/TIME:  5/3/2018 10:09 am



REASON FOR STUDY:  Possible aspiration



COMPARISON:  None.



TECHNIQUE:  Videofluoroscopic swallowing examination was performed in conjunction with speech patholo
gy.  Videofluoroscopic imaging was obtained and reviewed and these are the findings:



RADIATION DOSE:  1 minute 36 seconds of fluoro used.

1 images saved to PACS.



LIMITATIONS:  None



FINDINGS:  The patient was brought into the fluoro room and placed upright on a modified barium swall
ow chair.  The patient was then given multiple consistencies mixed with barium to swallow under live 
fluoroscopic video guidance.  According to the Speech Pathologist there was deep laryngeal penetratio
n with thins.  No aspiration.



IMPRESSION:  DEEP LARYNGEAL PENETRATION WITHOUT ASPIRATION.PLEASE SEE SPEECH PATHOLOGIST REPORT FOR O
THER FINDINGS AND RECOMMENDATIONS.



COMMENT:  Quality :  Final reports for procedures using fluoroscopy that document radiation exp
osure indices, or exposure time and number of fluorographic images (if radiation exposure indices are
 not available)



TECHNICAL DOCUMENTATION:  JOB ID: [id number]

 2011 [employer]- All Rights Reserved



Reading location - IP/workstation name: [email or web address] [id number]

## 2018-05-03 NOTE — ST INP MODIFIED BARIUM SWALLOW
Medical Diagnosis





- Medical Diagnoses


Medical Diagnosis Description & ICD-10 Code(s):  aspiration pneumonia J69.0





ST Inpatient [facility] [place]





- General


Date: 05/03/18





- History


History Obtained From: Other - EMR


-: Medical - altered mental status, advanced dementia, cognitive impairment, 

hyperlipidemia, hypothyroidism


Medications: Medications Reviewed


Allergies: No known allergies





- Subjective


Current Nutritional Means: PO


Current PO Diet: Pureed - and thin liquids


Current Symptoms: Hx of asp. pneumonia


Pain: no signs/symptoms of pain





- Objective


Assessment: Upright, Left Lateral





- Food Trials


Food Trials Used: Thin liquids, Pureed


The Patient: Required Assist





- Assessment


Labial Function: Within Normal Limits


Lingual Function: Within Normal Limits


Mandibular Function: Within Normal Limits


Dentition: Edentulous


Laryngeal Function: no volitional swallow, no volitional cough/clear





- Pharyngeal Stage


Initiation of Pharyngeal Stage: Delayed


Reflex Delay Time (seconds): 5


Decreased Laryngeal Elevation: No


Reduced Velo-Pharyngeal Closure: no


Reduced Pressure Generation: No


Reduced Tongue Base Retraction: No


Pre-Swallowing Pooling in Valleculae: Moderate


Pre-Swallowing Pooling in Pyriforms: None


Reduced Thyro-Hyiod Approximation: Yes - mild


Reduced Epiglottic Excursion: No


Reduced Pharyngeal Peristalsis: No


Post Swallow Residuals in Valleculae: Mild


Post Swallow Residuals in Pyriforms: Mild





- Impression/Summary


Laryngeal Penetration: Yes - with thin liquids, Silent, during swallow


Tracheal Aspiration: no


Compensatory Strategies: unable to follow directions for strategies


Patient Presents With: Pharyngeal stage dysph., Mild-Moderate


Risk of Aspiration: Mild


Risk of Nutritional Compromise: WNL


Risk Due To: delayed swallow, mild residue





- Recommendations


Solid Diet Recommendations: Pureed


Liquid Diet Recommendations: Thin


Strict Aspitarion Precautions: Yes


Dysphagia Therapy with SLP: No


Recommended Techniques: Fully Upright During Meal, Small Bites and Sips





- Time


Total Time: 20


Total Timed Minutes: 20

## 2018-05-03 NOTE — PDOC PROGRESS REPORT
Subjective


Progress Note for:: 05/03/18


Subjective:: 





Unable to obtain due to patient's mental status.  However patient appears to be 

more alert, eyes wide open.  Try to contact patients on, Keejohanna Brown via phone 

but unable





Review of systems


Unable to obtain due to patient's mental status





All significant laboratories and diagnostics have been reviewed


Reason For Visit: 


ASPIRATION PNEUMONIA, CONSTIPATION








Physical Exam


Vital Signs: 


 











Temp Pulse Resp BP Pulse Ox


 


 98.4 F   83   18   102/56 L  95 


 


 05/03/18 00:00  05/03/18 00:00  05/03/18 00:00  05/03/18 00:00  05/03/18 00:00








 Intake & Output











 05/02/18 05/03/18 05/04/18





 06:59 06:59 06:59


 


Intake Total  2950 


 


Balance  2950 


 


Weight 65.2 kg 66 kg 











General appearance: PRESENT: no acute distress, cooperative, well-developed, 

well-nourished


Head exam: PRESENT: atraumatic, normocephalic


Eye exam: PRESENT: conjunctiva pink, EOMI, PERRLA


Ear exam: PRESENT: normal external ear exam


Mouth exam: PRESENT: moist


Neck exam: PRESENT: full ROM.  ABSENT: JVD, lymphadenopathy, tenderness


Respiratory exam: PRESENT: clear to auscultation jose


Cardiovascular exam: PRESENT: RRR.  ABSENT: diastolic murmur, systolic murmur


GI/Abdominal exam: PRESENT: normal bowel sounds, soft.  ABSENT: tenderness


Extremities exam: PRESENT: full ROM.  ABSENT: pedal edema


Musculoskeletal exam: PRESENT: ambulatory


Neurological exam: PRESENT: alert, awake.  ABSENT: oriented to person, oriented 

to place, oriented to time


Psychiatric exam: PRESENT: depressed


Skin exam: PRESENT: intact, normal color





Results


Laboratory Results: 


 





 05/03/18 06:51 





 05/03/18 06:51 





 











  05/03/18 05/03/18





  06:51 06:51


 


WBC  5.6 


 


RBC  3.70 L 


 


Hgb  11.3 L D 


 


Hct  32.1 L 


 


MCV  87 


 


MCH  30.6 


 


MCHC  35.2 


 


RDW  13.7 


 


Plt Count  214 


 


Seg Neutrophils %  50.6 


 


Lymphocytes %  35.2 


 


Monocytes %  11.6 


 


Eosinophils %  1.9 


 


Basophils %  0.7 


 


Absolute Neutrophils  2.8 


 


Absolute Lymphocytes  2.0 


 


Absolute Monocytes  0.6 


 


Absolute Eosinophils  0.1 


 


Absolute Basophils  0.0 


 


Sodium   148.4 H


 


Potassium   3.1 L


 


Chloride   108 H


 


Carbon Dioxide   34 H


 


Anion Gap   6


 


BUN   10


 


Creatinine   0.56


 


Est GFR ( Amer)   > 60


 


Est GFR (Non-Af Amer)   > 60


 


Glucose   93


 


Calcium   8.2 L











Impressions: 


 





Abdomen/Pelvis CT  05/01/18 19:46


IMPRESSION:  1.  Stable hepatic cysts.


2.  Mild ductal dilatation suggesting prior cholecystectomy.  Surgical clips 

are not seen, however.


3.  Thickening of both adrenal glands that appears stable.


4.  Extensive diverticulosis coli with no acute inflammatory changes.


5.  Considerable stool.  A large amount of stool in the rectum raises the 

possibility of fecal impaction.


6.  Scoliosis and mild lumbar degenerative disc changes.


 








Chest X-Ray  05/01/18 22:24


IMPRESSION:  NO ACUTE RADIOGRAPHIC FINDING IN THE CHEST.


 








Head CT  05/02/18 00:00


IMPRESSION:  MICROVASCULAR ISCHEMIA AND GENERALIZED ATROPHY. NO ACUTE IMAGING 

FINDINGS IN THE BRAIN


EVIDENCE OF ACUTE STROKE: NO.


 














Assessment & Plan





- Diagnosis


(1) Hypernatremia


Is this a current diagnosis for this admission?: Yes   


Plan: 


Change IV fluids and add potassium.  Trend








(2) Aspiration pneumonia


Qualifiers: 


   Aspiration pneumonia type: due to vomit   Laterality: right   Lung location: 

lower lobe of lung   Qualified Code(s): J69.0 - Pneumonitis due to inhalation 

of food and vomit   


Is this a current diagnosis for this admission?: Yes   


Plan: 


To transition to Augmentin and repeat chest x-ray.  Normal modified barium 

swallow








(3) Fecal impaction


Is this a current diagnosis for this admission?: Yes   


Plan: 


Continue bowel regimen








(4) Hypothyroidism


Qualifiers: 


   Hypothyroidism type: acquired   Qualified Code(s): E03.9 - Hypothyroidism, 

unspecified   


Is this a current diagnosis for this admission?: Yes   


Plan: 


Continue outpatient regimen








(5) Dementia


Qualifiers: 


   Alzheimer's disease onset: unspecified onset   Dementia behavioral 

disturbance: without behavioral disturbance 


Is this a current diagnosis for this admission?: Yes   


Plan: 


Supportive care.  Restart Namenda and Exelon.  Order physical therapy.








(6) Altered mental status


Qualifiers: 


   Altered mental status type: somnolence   Qualified Code(s): R40.0 - 

Somnolence   


Is this a current diagnosis for this admission?: Yes   


Plan: 


CT of the brain results noted.  The patient is more alert but still not wanting 

to talk.  To restart dementia medications








(7) Abdominal pain


Qualifiers: 


   Abdominal location: generalized   Qualified Code(s): R10.84 - Generalized 

abdominal pain   


Is this a current diagnosis for this admission?: Yes   


Plan: 


Resolved








(8) Hypokalemia


Is this a current diagnosis for this admission?: Yes   


Plan: 


Replace oral and through IV.  To trend








- Time


Time Spent with patient: 15-24 minutes


Medications reviewed and adjusted accordingly: Yes


Anticipated discharge: SNF


Within: within 48 hours





- Inpatient Certification


Based on my medical assessment, after consideration of the patient's 

comorbidities, presenting symptoms, or acuity I expect that the services needed 

warrant INPATIENT care.: Yes


I certify that my determination is in accordance with my understanding of 

Medicare's requirements for reasonable and necessary INPATIENT services [42 CFR 

412.3e].: Yes


Medical Necessity: Need Close Monitoring Due to Risk of Patient Decompensation, 

Need For IV Fluids

## 2018-05-04 LAB
ADD MANUAL DIFF: NO
BASOPHILS # BLD AUTO: 0 10^3/UL (ref 0–0.2)
BASOPHILS NFR BLD AUTO: 0.6 % (ref 0–2)
EOSINOPHIL # BLD AUTO: 0.1 10^3/UL (ref 0–0.6)
EOSINOPHIL NFR BLD AUTO: 1.9 % (ref 0–6)
ERYTHROCYTE [DISTWIDTH] IN BLOOD BY AUTOMATED COUNT: 13.4 % (ref 11.5–14)
HCT VFR BLD CALC: 34.6 % (ref 36–47)
HGB BLD-MCNC: 11.8 G/DL (ref 12–15.5)
LYMPHOCYTES # BLD AUTO: 1.9 10^3/UL (ref 0.5–4.7)
LYMPHOCYTES NFR BLD AUTO: 31.6 % (ref 13–45)
MCH RBC QN AUTO: 30.2 PG (ref 27–33.4)
MCHC RBC AUTO-ENTMCNC: 34.2 G/DL (ref 32–36)
MCV RBC AUTO: 89 FL (ref 80–97)
MONOCYTES # BLD AUTO: 0.9 10^3/UL (ref 0.1–1.4)
MONOCYTES NFR BLD AUTO: 15 % (ref 3–13)
NEUTROPHILS # BLD AUTO: 3 10^3/UL (ref 1.7–8.2)
NEUTS SEG NFR BLD AUTO: 50.9 % (ref 42–78)
PLATELET # BLD: 260 10^3/UL (ref 150–450)
RBC # BLD AUTO: 3.9 10^6/UL (ref 3.72–5.28)
TOTAL CELLS COUNTED % (AUTO): 100 %
WBC # BLD AUTO: 6 10^3/UL (ref 4–10.5)

## 2018-05-04 RX ADMIN — METOCLOPRAMIDE SCH MG: 5 INJECTION, SOLUTION INTRAMUSCULAR; INTRAVENOUS at 06:03

## 2018-05-04 RX ADMIN — ASPIRIN 325 MG ORAL TABLET SCH MG: 325 PILL ORAL at 11:07

## 2018-05-04 RX ADMIN — LANSOPRAZOLE SCH MG: 30 TABLET, ORALLY DISINTEGRATING, DELAYED RELEASE ORAL at 06:03

## 2018-05-04 RX ADMIN — LEVOTHYROXINE SODIUM SCH MG: 100 TABLET ORAL at 06:03

## 2018-05-04 RX ADMIN — LACTULOSE SCH GM: 20 SOLUTION ORAL at 11:07

## 2018-05-04 RX ADMIN — LEVETIRACETAM SCH ML: 5 INJECTION INTRAVENOUS at 06:03

## 2018-05-04 RX ADMIN — ATORVASTATIN CALCIUM SCH MG: 10 TABLET, FILM COATED ORAL at 21:30

## 2018-05-04 RX ADMIN — METOCLOPRAMIDE SCH MG: 5 INJECTION, SOLUTION INTRAMUSCULAR; INTRAVENOUS at 11:06

## 2018-05-04 RX ADMIN — AMOXICILLIN AND CLAVULANATE POTASSIUM SCH TAB: 500; 125 TABLET, FILM COATED ORAL at 06:03

## 2018-05-04 RX ADMIN — DEXTROSE, SODIUM CHLORIDE, AND POTASSIUM CHLORIDE PRN ML: 5; .45; .3 INJECTION INTRAVENOUS at 11:09

## 2018-05-04 RX ADMIN — DEXTROSE, SODIUM CHLORIDE, AND POTASSIUM CHLORIDE PRN ML: 5; .45; .3 INJECTION INTRAVENOUS at 21:28

## 2018-05-04 RX ADMIN — SENNOSIDES, DOCUSATE SODIUM SCH EACH: 50; 8.6 TABLET, FILM COATED ORAL at 21:30

## 2018-05-04 RX ADMIN — RIVASTIGMINE TARTRATE SCH MG: 1.5 CAPSULE ORAL at 08:16

## 2018-05-04 RX ADMIN — AMOXICILLIN AND CLAVULANATE POTASSIUM SCH TAB: 500; 125 TABLET, FILM COATED ORAL at 15:24

## 2018-05-04 RX ADMIN — OMEGA-3-ACID ETHYL ESTERS SCH GM: 900 CAPSULE ORAL at 11:05

## 2018-05-04 RX ADMIN — DIVALPROEX SODIUM SCH MG: 250 TABLET, FILM COATED, EXTENDED RELEASE ORAL at 21:30

## 2018-05-04 RX ADMIN — MULTIVITAMIN TABLET SCH TAB: TABLET at 11:07

## 2018-05-04 RX ADMIN — ENOXAPARIN SODIUM SCH MG: 40 INJECTION SUBCUTANEOUS at 11:08

## 2018-05-04 RX ADMIN — RIVASTIGMINE TARTRATE SCH MG: 1.5 CAPSULE ORAL at 15:24

## 2018-05-04 RX ADMIN — AMOXICILLIN AND CLAVULANATE POTASSIUM SCH TAB: 500; 125 TABLET, FILM COATED ORAL at 21:30

## 2018-05-04 RX ADMIN — CALCIUM CARBONATE-CHOLECALCIFEROL TAB 250 MG-125 UNIT SCH TAB: 250-125 TAB at 11:03

## 2018-05-04 NOTE — RADIOLOGY REPORT (SQ)
EXAM DESCRIPTION:  CHEST 2 VIEWS



COMPLETED DATE/TIME:  5/4/2018 7:56 am



REASON FOR STUDY:  follow up



COMPARISON:  Comparison 5/1/2018, 1/9/2018, 9/17/2017



EXAM PARAMETERS:  NUMBER OF VIEWS: two views

TECHNIQUE: Digital Frontal and Lateral radiographic views of the chest acquired.

RADIATION DOSE: NA

LIMITATIONS: none



FINDINGS:  LUNGS AND PLEURA: Pulmonary vascular prominence.

There is blunting of the posterior costophrenic sulci from trace pleural fluid and minimal basilar ai
rspace disease likely atelectasis.

No pneumothorax.  No pulmonary nodules.

MEDIASTINUM AND HILAR STRUCTURES: No masses or contour abnormalities.

HEART AND VASCULAR STRUCTURES: Mild cardiomegaly

BONES: No acute findings.

HARDWARE: None in the chest.

OTHER: No other significant finding.



IMPRESSION:  Pulmonary vascular prominence with trace pleural effusions and mild bibasilar atelectasi
s



TECHNICAL DOCUMENTATION:  JOB ID:  [id number]

 2011 [employer]- All Rights Reserved



Reading location - IP/workstation name: [email or web address]

## 2018-05-04 NOTE — PDOC PROGRESS REPORT
Subjective


Progress Note for:: 05/04/18


Subjective:: 





Unable to obtain due to patient's mental status. Patient appears to be more 

alert however is nonverbal.  PT stated that patient is not participating in 

physical therapy either





Review of systems


Unable to obtain due to patient's mental status





All significant laboratories and diagnostics have been reviewed


Reason For Visit: 


ASPIRATION PNEUMONIA, CONSTIPATION








Physical Exam


Vital Signs: 


 











Temp Pulse Resp BP Pulse Ox


 


 97.3 F   88   16   104/70   98 


 


 05/03/18 23:45  05/03/18 23:45  05/03/18 23:45  05/03/18 23:45  05/03/18 23:45








 Intake & Output











 05/03/18 05/04/18 05/05/18





 06:59 06:59 06:59


 


Intake Total 2950 1612 


 


Balance 2950 1612 


 


Weight 66 kg 66 kg 











General appearance: PRESENT: no acute distress, cooperative


Head exam: PRESENT: atraumatic, normocephalic


Eye exam: PRESENT: conjunctiva pink, EOMI, PERRLA


Mouth exam: PRESENT: moist


Neck exam: PRESENT: full ROM.  ABSENT: JVD, lymphadenopathy, tenderness


Respiratory exam: PRESENT: clear to auscultation jose


Cardiovascular exam: PRESENT: RRR.  ABSENT: diastolic murmur, systolic murmur


Vascular exam: PRESENT: normal capillary refill


GI/Abdominal exam: PRESENT: normal bowel sounds, soft.  ABSENT: tenderness


Extremities exam: ABSENT: full ROM, pedal edema


Musculoskeletal exam: ABSENT: ambulatory


Neurological exam: PRESENT: alert, awake


Psychiatric exam: PRESENT: flat affect


Skin exam: PRESENT: normal color





Results


Laboratory Results: 


 





 05/04/18 06:17 





 05/03/18 11:40 





 











  05/03/18 05/04/18 05/04/18





  11:40 06:17 06:17


 


WBC   6.0 


 


RBC   3.90 


 


Hgb   11.8 L 


 


Hct   34.6 L 


 


MCV   89 


 


MCH   30.2 


 


MCHC   34.2 


 


RDW   13.4 


 


Plt Count   260 


 


Seg Neutrophils %   50.9 


 


Lymphocytes %   31.6 


 


Monocytes %   15.0 H 


 


Eosinophils %   1.9 


 


Basophils %   0.6 


 


Absolute Neutrophils   3.0 


 


Absolute Lymphocytes   1.9 


 


Absolute Monocytes   0.9 


 


Absolute Eosinophils   0.1 


 


Absolute Basophils   0.0 


 


Sodium  147.7 H  


 


Potassium  3.5 L  


 


Chloride  106  


 


Carbon Dioxide  33 H  


 


Anion Gap  9  


 


BUN  9  


 


Creatinine  0.54  


 


Est GFR ( Amer)  > 60  


 


Est GFR (Non-Af Amer)  > 60  


 


Glucose  97  


 


Calcium  8.8  


 


Magnesium    1.8











Impressions: 


 





Abdomen/Pelvis CT  05/01/18 19:46


IMPRESSION:  1.  Stable hepatic cysts.


2.  Mild ductal dilatation suggesting prior cholecystectomy.  Surgical clips 

are not seen, however.


3.  Thickening of both adrenal glands that appears stable.


4.  Extensive diverticulosis coli with no acute inflammatory changes.


5.  Considerable stool.  A large amount of stool in the rectum raises the 

possibility of fecal impaction.


6.  Scoliosis and mild lumbar degenerative disc changes.


 








Head CT  05/02/18 00:00


IMPRESSION:  MICROVASCULAR ISCHEMIA AND GENERALIZED ATROPHY. NO ACUTE IMAGING 

FINDINGS IN THE BRAIN


EVIDENCE OF ACUTE STROKE: NO.


 








Modified Barium Swallow  05/03/18 00:00


IMPRESSION:  DEEP LARYNGEAL PENETRATION WITHOUT ASPIRATION.PLEASE SEE SPEECH 

PATHOLOGIST REPORT FOR OTHER FINDINGS AND RECOMMENDATIONS.


 








Chest X-Ray  05/04/18 00:00


IMPRESSION:  Pulmonary vascular prominence with trace pleural effusions and 

mild bibasilar atelectasis


 














Assessment & Plan





- Diagnosis


(1) Hypernatremia


Is this a current diagnosis for this admission?: Yes   


Plan: 


Continue IV fluids and trend








(2) Aspiration pneumonia


Qualifiers: 


   Aspiration pneumonia type: due to vomit   Laterality: right   Lung location: 

lower lobe of lung   Qualified Code(s): J69.0 - Pneumonitis due to inhalation 

of food and vomit   


Is this a current diagnosis for this admission?: Yes   


Plan: 


Continue Augmentin.  Chest x-ray results noted








(3) Fecal impaction


Is this a current diagnosis for this admission?: Yes   


Plan: 


Resolved








(4) Hypothyroidism


Qualifiers: 


   Hypothyroidism type: acquired   Qualified Code(s): E03.9 - Hypothyroidism, 

unspecified   


Is this a current diagnosis for this admission?: Yes   


Plan: 


Continue outpatient regimen








(5) Dementia


Qualifiers: 


   Alzheimer's disease onset: unspecified onset   Dementia behavioral 

disturbance: without behavioral disturbance 


Is this a current diagnosis for this admission?: Yes   


Plan: 


Supportive care.  Continue Namenda and Exelon








(6) Altered mental status


Qualifiers: 


   Altered mental status type: somnolence   Qualified Code(s): R40.0 - 

Somnolence   


Is this a current diagnosis for this admission?: Yes   


Plan: 


CT of the brain results noted.  The patient is more alert but still not wanting 

to talk.  








(7) Abdominal pain


Qualifiers: 


   Abdominal location: generalized   Qualified Code(s): R10.84 - Generalized 

abdominal pain   


Is this a current diagnosis for this admission?: Yes   


Plan: 


Resolved








(8) Hypokalemia


Is this a current diagnosis for this admission?: Yes   


Plan: 


Replace oral and through IV.  To trend








- Time


Time Spent with patient: 15-24 minutes


Medications reviewed and adjusted accordingly: Yes


Anticipated discharge: SNF


Within: within 72 hours





- Inpatient Certification


Based on my medical assessment, after consideration of the patient's 

comorbidities, presenting symptoms, or acuity I expect that the services needed 

warrant INPATIENT care.: Yes


I certify that my determination is in accordance with my understanding of 

Medicare's requirements for reasonable and necessary INPATIENT services [42 CFR 

412.3e].: Yes


Medical Necessity: Need Close Monitoring Due to Risk of Patient Decompensation, 

Need For IV Fluids

## 2018-05-05 LAB
ADD MANUAL DIFF: NO
ANION GAP SERPL CALC-SCNC: 9 MMOL/L (ref 5–19)
BASOPHILS # BLD AUTO: 0 10^3/UL (ref 0–0.2)
BASOPHILS NFR BLD AUTO: 0.6 % (ref 0–2)
BUN SERPL-MCNC: 5 MG/DL (ref 7–20)
CALCIUM: 9.4 MG/DL (ref 8.4–10.2)
CHLORIDE SERPL-SCNC: 108 MMOL/L (ref 98–107)
CO2 SERPL-SCNC: 28 MMOL/L (ref 22–30)
EOSINOPHIL # BLD AUTO: 0.2 10^3/UL (ref 0–0.6)
EOSINOPHIL NFR BLD AUTO: 2.4 % (ref 0–6)
ERYTHROCYTE [DISTWIDTH] IN BLOOD BY AUTOMATED COUNT: 13.6 % (ref 11.5–14)
GLUCOSE SERPL-MCNC: 96 MG/DL (ref 75–110)
HCT VFR BLD CALC: 35.8 % (ref 36–47)
HGB BLD-MCNC: 12.3 G/DL (ref 12–15.5)
LYMPHOCYTES # BLD AUTO: 2.4 10^3/UL (ref 0.5–4.7)
LYMPHOCYTES NFR BLD AUTO: 32 % (ref 13–45)
MCH RBC QN AUTO: 30.3 PG (ref 27–33.4)
MCHC RBC AUTO-ENTMCNC: 34.4 G/DL (ref 32–36)
MCV RBC AUTO: 88 FL (ref 80–97)
MONOCYTES # BLD AUTO: 0.9 10^3/UL (ref 0.1–1.4)
MONOCYTES NFR BLD AUTO: 12.4 % (ref 3–13)
NEUTROPHILS # BLD AUTO: 3.9 10^3/UL (ref 1.7–8.2)
NEUTS SEG NFR BLD AUTO: 52.6 % (ref 42–78)
PLATELET # BLD: 288 10^3/UL (ref 150–450)
POTASSIUM SERPL-SCNC: 4.9 MMOL/L (ref 3.6–5)
RBC # BLD AUTO: 4.06 10^6/UL (ref 3.72–5.28)
SODIUM SERPL-SCNC: 145.4 MMOL/L (ref 137–145)
TOTAL CELLS COUNTED % (AUTO): 100 %
WBC # BLD AUTO: 7.4 10^3/UL (ref 4–10.5)

## 2018-05-05 RX ADMIN — RIVASTIGMINE TARTRATE SCH MG: 1.5 CAPSULE ORAL at 15:30

## 2018-05-05 RX ADMIN — LEVOTHYROXINE SODIUM SCH MG: 100 TABLET ORAL at 06:06

## 2018-05-05 RX ADMIN — DEXTROSE, SODIUM CHLORIDE, AND POTASSIUM CHLORIDE PRN ML: 5; .45; .3 INJECTION INTRAVENOUS at 15:30

## 2018-05-05 RX ADMIN — LACTULOSE SCH GM: 20 SOLUTION ORAL at 10:02

## 2018-05-05 RX ADMIN — AMOXICILLIN AND CLAVULANATE POTASSIUM SCH TAB: 500; 125 TABLET, FILM COATED ORAL at 06:06

## 2018-05-05 RX ADMIN — DIVALPROEX SODIUM SCH MG: 250 TABLET, FILM COATED, EXTENDED RELEASE ORAL at 22:56

## 2018-05-05 RX ADMIN — OMEGA-3-ACID ETHYL ESTERS SCH GM: 900 CAPSULE ORAL at 10:02

## 2018-05-05 RX ADMIN — AMOXICILLIN AND CLAVULANATE POTASSIUM SCH TAB: 500; 125 TABLET, FILM COATED ORAL at 14:39

## 2018-05-05 RX ADMIN — RIVASTIGMINE TARTRATE SCH MG: 1.5 CAPSULE ORAL at 08:53

## 2018-05-05 RX ADMIN — CALCIUM CARBONATE-CHOLECALCIFEROL TAB 250 MG-125 UNIT SCH TAB: 250-125 TAB at 10:02

## 2018-05-05 RX ADMIN — AMOXICILLIN AND CLAVULANATE POTASSIUM SCH TAB: 500; 125 TABLET, FILM COATED ORAL at 22:56

## 2018-05-05 RX ADMIN — ATORVASTATIN CALCIUM SCH MG: 10 TABLET, FILM COATED ORAL at 22:56

## 2018-05-05 RX ADMIN — SENNOSIDES, DOCUSATE SODIUM SCH EACH: 50; 8.6 TABLET, FILM COATED ORAL at 22:56

## 2018-05-05 RX ADMIN — MULTIVITAMIN TABLET SCH TAB: TABLET at 10:02

## 2018-05-05 RX ADMIN — LANSOPRAZOLE SCH MG: 30 TABLET, ORALLY DISINTEGRATING, DELAYED RELEASE ORAL at 06:06

## 2018-05-05 RX ADMIN — ASPIRIN 325 MG ORAL TABLET SCH MG: 325 PILL ORAL at 10:03

## 2018-05-05 RX ADMIN — ENOXAPARIN SODIUM SCH MG: 40 INJECTION SUBCUTANEOUS at 10:03

## 2018-05-05 NOTE — PDOC PROGRESS REPORT
Subjective


Progress Note for:: 05/05/18


Subjective:: 





Unable to obtain due to patient's mental status. Patient somnolent at the time 

of evaluation





Review of systems


Unable to obtain due to patient's mental status





All significant laboratories and diagnostics have been reviewed


Reason For Visit: 


ASPIRATION PNEUMONIA, CONSTIPATION








Physical Exam


Vital Signs: 


 











Temp Pulse Resp BP Pulse Ox


 


 97.9 F   79   14   137/78 H  99 


 


 05/04/18 23:29  05/04/18 23:29  05/04/18 23:29  05/04/18 23:29  05/04/18 23:29








 Intake & Output











 05/04/18 05/05/18 05/06/18





 06:59 06:59 06:59


 


Intake Total 1612 3037 


 


Balance 1612 3037 


 


Weight 66 kg 66.2 kg 











General appearance: PRESENT: no acute distress, cooperative, well-developed, 

well-nourished


Head exam: PRESENT: normocephalic


Eye exam: PRESENT: conjunctiva pink, EOMI, PERRLA


Mouth exam: PRESENT: moist


Neck exam: ABSENT: JVD, lymphadenopathy, tenderness


Respiratory exam: PRESENT: clear to auscultation jose


Cardiovascular exam: PRESENT: RRR.  ABSENT: diastolic murmur, systolic murmur


Vascular exam: PRESENT: normal capillary refill


GI/Abdominal exam: PRESENT: normal bowel sounds, soft.  ABSENT: tenderness


Extremities exam: PRESENT: full ROM.  ABSENT: pedal edema


Musculoskeletal exam: ABSENT: ambulatory


Neurological exam: PRESENT: other - somnolent


Skin exam: PRESENT: normal color





Results


Laboratory Results: 


 





 05/05/18 05:05 





 05/05/18 05:05 





 











  05/04/18 05/04/18 05/05/18





  06:17 06:17 05:05


 


WBC  6.0   7.4


 


RBC  3.90   4.06


 


Hgb  11.8 L   12.3


 


Hct  34.6 L   35.8 L


 


MCV  89   88


 


MCH  30.2   30.3


 


MCHC  34.2   34.4


 


RDW  13.4   13.6


 


Plt Count  260   288


 


Seg Neutrophils %  50.9   52.6


 


Lymphocytes %  31.6   32.0


 


Monocytes %  15.0 H   12.4


 


Eosinophils %  1.9   2.4


 


Basophils %  0.6   0.6


 


Absolute Neutrophils  3.0   3.9


 


Absolute Lymphocytes  1.9   2.4


 


Absolute Monocytes  0.9   0.9


 


Absolute Eosinophils  0.1   0.2


 


Absolute Basophils  0.0   0.0


 


Sodium   


 


Potassium   


 


Chloride   


 


Carbon Dioxide   


 


Anion Gap   


 


BUN   


 


Creatinine   


 


Est GFR ( Amer)   


 


Est GFR (Non-Af Amer)   


 


Glucose   


 


Calcium   


 


Magnesium   1.8 














  05/05/18





  05:05


 


WBC 


 


RBC 


 


Hgb 


 


Hct 


 


MCV 


 


MCH 


 


MCHC 


 


RDW 


 


Plt Count 


 


Seg Neutrophils % 


 


Lymphocytes % 


 


Monocytes % 


 


Eosinophils % 


 


Basophils % 


 


Absolute Neutrophils 


 


Absolute Lymphocytes 


 


Absolute Monocytes 


 


Absolute Eosinophils 


 


Absolute Basophils 


 


Sodium  145.4 H


 


Potassium  4.9


 


Chloride  108 H


 


Carbon Dioxide  28


 


Anion Gap  9


 


BUN  5 L


 


Creatinine  0.51 L


 


Est GFR ( Amer)  > 60


 


Est GFR (Non-Af Amer)  > 60


 


Glucose  96


 


Calcium  9.4


 


Magnesium 











Impressions: 


 





Abdomen/Pelvis CT  05/01/18 19:46


IMPRESSION:  1.  Stable hepatic cysts.


2.  Mild ductal dilatation suggesting prior cholecystectomy.  Surgical clips 

are not seen, however.


3.  Thickening of both adrenal glands that appears stable.


4.  Extensive diverticulosis coli with no acute inflammatory changes.


5.  Considerable stool.  A large amount of stool in the rectum raises the 

possibility of fecal impaction.


6.  Scoliosis and mild lumbar degenerative disc changes.


 








Head CT  05/02/18 00:00


IMPRESSION:  MICROVASCULAR ISCHEMIA AND GENERALIZED ATROPHY. NO ACUTE IMAGING 

FINDINGS IN THE BRAIN


EVIDENCE OF ACUTE STROKE: NO.


 








Modified Barium Swallow  05/03/18 00:00


IMPRESSION:  DEEP LARYNGEAL PENETRATION WITHOUT ASPIRATION.PLEASE SEE SPEECH 

PATHOLOGIST REPORT FOR OTHER FINDINGS AND RECOMMENDATIONS.


 








Chest X-Ray  05/04/18 00:00


IMPRESSION:  Pulmonary vascular prominence with trace pleural effusions and 

mild bibasilar atelectasis


 














Assessment & Plan





- Diagnosis


(1) Hypernatremia


Is this a current diagnosis for this admission?: Yes   


Plan: 


Improve.  Discontinue fluids








(2) Aspiration pneumonia


Qualifiers: 


   Aspiration pneumonia type: due to vomit   Laterality: right   Lung location: 

lower lobe of lung   Qualified Code(s): J69.0 - Pneumonitis due to inhalation 

of food and vomit   


Is this a current diagnosis for this admission?: Yes   


Plan: 


Continue Augmentin.  Chest x-ray results noted. Anticipate to discharge to the 

on May 7 continue stable








(3) Fecal impaction


Is this a current diagnosis for this admission?: Yes   


Plan: 


Resolved








(4) Hypothyroidism


Qualifiers: 


   Hypothyroidism type: acquired   Qualified Code(s): E03.9 - Hypothyroidism, 

unspecified   


Is this a current diagnosis for this admission?: Yes   


Plan: 


Continue outpatient regimen








(5) Dementia


Qualifiers: 


   Alzheimer's disease onset: unspecified onset   Dementia behavioral 

disturbance: without behavioral disturbance 


Is this a current diagnosis for this admission?: Yes   


Plan: 


Supportive care.  Continue Namenda and Exelon








(6) Altered mental status


Qualifiers: 


   Altered mental status type: somnolence   Qualified Code(s): R40.0 - 

Somnolence   


Is this a current diagnosis for this admission?: Yes   


Plan: 


CT of the brain results noted.  Had improved.








(7) Abdominal pain


Qualifiers: 


   Abdominal location: generalized   Qualified Code(s): R10.84 - Generalized 

abdominal pain   


Is this a current diagnosis for this admission?: Yes   


Plan: 


Resolved








(8) Hypokalemia


Is this a current diagnosis for this admission?: Yes   


Plan: 


Replaced








- Time


Time Spent with patient: 15-24 minutes


Medications reviewed and adjusted accordingly: Yes


Anticipated discharge: SNF


Within: within 48 hours





- Inpatient Certification


Based on my medical assessment, after consideration of the patient's 

comorbidities, presenting symptoms, or acuity I expect that the services needed 

warrant INPATIENT care.: Yes


I certify that my determination is in accordance with my understanding of 

Medicare's requirements for reasonable and necessary INPATIENT services [42 CFR 

412.3e].: Yes


Medical Necessity: Significant Comorbidiites Make Outpatient Treatment Too Risky

, Need Close Monitoring Due to Risk of Patient Decompensation

## 2018-05-06 RX ADMIN — SENNOSIDES, DOCUSATE SODIUM SCH EACH: 50; 8.6 TABLET, FILM COATED ORAL at 22:11

## 2018-05-06 RX ADMIN — LEVOTHYROXINE SODIUM SCH MG: 100 TABLET ORAL at 06:52

## 2018-05-06 RX ADMIN — MULTIVITAMIN TABLET SCH TAB: TABLET at 09:39

## 2018-05-06 RX ADMIN — LACTULOSE SCH GM: 20 SOLUTION ORAL at 09:39

## 2018-05-06 RX ADMIN — ATORVASTATIN CALCIUM SCH MG: 10 TABLET, FILM COATED ORAL at 22:11

## 2018-05-06 RX ADMIN — DIVALPROEX SODIUM SCH MG: 250 TABLET, FILM COATED, EXTENDED RELEASE ORAL at 22:11

## 2018-05-06 RX ADMIN — NYSTATIN SCH APPLIC: 100000 POWDER TOPICAL at 17:17

## 2018-05-06 RX ADMIN — OMEGA-3-ACID ETHYL ESTERS SCH GM: 900 CAPSULE ORAL at 09:39

## 2018-05-06 RX ADMIN — AMOXICILLIN AND CLAVULANATE POTASSIUM SCH TAB: 500; 125 TABLET, FILM COATED ORAL at 22:11

## 2018-05-06 RX ADMIN — CALCIUM CARBONATE-CHOLECALCIFEROL TAB 250 MG-125 UNIT SCH TAB: 250-125 TAB at 09:39

## 2018-05-06 RX ADMIN — ASPIRIN 325 MG ORAL TABLET SCH MG: 325 PILL ORAL at 09:39

## 2018-05-06 RX ADMIN — RIVASTIGMINE TARTRATE SCH MG: 1.5 CAPSULE ORAL at 08:13

## 2018-05-06 RX ADMIN — ENOXAPARIN SODIUM SCH MG: 40 INJECTION SUBCUTANEOUS at 09:38

## 2018-05-06 RX ADMIN — AMOXICILLIN AND CLAVULANATE POTASSIUM SCH TAB: 500; 125 TABLET, FILM COATED ORAL at 06:52

## 2018-05-06 RX ADMIN — RIVASTIGMINE TARTRATE SCH MG: 1.5 CAPSULE ORAL at 17:17

## 2018-05-06 RX ADMIN — DEXTROSE, SODIUM CHLORIDE, AND POTASSIUM CHLORIDE PRN ML: 5; .45; .3 INJECTION INTRAVENOUS at 17:22

## 2018-05-06 RX ADMIN — AMOXICILLIN AND CLAVULANATE POTASSIUM SCH TAB: 500; 125 TABLET, FILM COATED ORAL at 13:35

## 2018-05-06 RX ADMIN — LANSOPRAZOLE SCH MG: 30 TABLET, ORALLY DISINTEGRATING, DELAYED RELEASE ORAL at 06:52

## 2018-05-06 RX ADMIN — DEXTROSE, SODIUM CHLORIDE, AND POTASSIUM CHLORIDE PRN ML: 5; .45; .3 INJECTION INTRAVENOUS at 06:52

## 2018-05-06 NOTE — PDOC PROGRESS REPORT
Subjective


Progress Note for:: 05/06/18


Subjective:: 





Unable to obtain due to patient's mental status. Awake today but nonverbal. 

Nurse reports patient's son to change nursing home





Review of systems


Unable to obtain due to patient's mental status





All significant laboratories and diagnostics have been reviewed


Reason For Visit: 


ASPIRATION PNEUMONIA, CONSTIPATION








Physical Exam


Vital Signs: 


 











Temp Pulse Resp BP Pulse Ox


 


 98.2 F   88   14   115/71   95 


 


 05/05/18 23:28  05/05/18 23:28  05/05/18 23:28  05/05/18 23:28  05/05/18 23:28








 Intake & Output











 05/05/18 05/06/18 05/07/18





 06:59 06:59 06:59


 


Intake Total 3037 3406 


 


Balance 3037 3406 


 


Weight 66.2 kg 66.4 kg 











General appearance: PRESENT: no acute distress, cooperative


Head exam: PRESENT: atraumatic, normocephalic


Eye exam: PRESENT: conjunctiva pink, EOMI, PERRLA


Ear exam: PRESENT: normal external ear exam


Mouth exam: PRESENT: moist


Neck exam: PRESENT: full ROM.  ABSENT: JVD, lymphadenopathy, tenderness


Respiratory exam: PRESENT: clear to auscultation jose


Cardiovascular exam: PRESENT: RRR, systolic murmur.  ABSENT: diastolic murmur


Vascular exam: PRESENT: normal capillary refill


GI/Abdominal exam: PRESENT: normal bowel sounds, soft.  ABSENT: tenderness


Extremities exam: PRESENT: full ROM.  ABSENT: pedal edema


Musculoskeletal exam: ABSENT: ambulatory


Neurological exam: PRESENT: awake


Psychiatric exam: PRESENT: flat affect


Skin exam: PRESENT: intact, normal color





Results


Laboratory Results: 


 





 05/05/18 05:05 





 05/05/18 05:05 








Impressions: 


 





Abdomen/Pelvis CT  05/01/18 19:46


IMPRESSION:  1.  Stable hepatic cysts.


2.  Mild ductal dilatation suggesting prior cholecystectomy.  Surgical clips 

are not seen, however.


3.  Thickening of both adrenal glands that appears stable.


4.  Extensive diverticulosis coli with no acute inflammatory changes.


5.  Considerable stool.  A large amount of stool in the rectum raises the 

possibility of fecal impaction.


6.  Scoliosis and mild lumbar degenerative disc changes.


 








Head CT  05/02/18 00:00


IMPRESSION:  MICROVASCULAR ISCHEMIA AND GENERALIZED ATROPHY. NO ACUTE IMAGING 

FINDINGS IN THE BRAIN


EVIDENCE OF ACUTE STROKE: NO.


 








Modified Barium Swallow  05/03/18 00:00


IMPRESSION:  DEEP LARYNGEAL PENETRATION WITHOUT ASPIRATION.PLEASE SEE SPEECH 

PATHOLOGIST REPORT FOR OTHER FINDINGS AND RECOMMENDATIONS.


 








Chest X-Ray  05/04/18 00:00


IMPRESSION:  Pulmonary vascular prominence with trace pleural effusions and 

mild bibasilar atelectasis


 














Assessment & Plan





- Diagnosis


(1) Hypernatremia


Is this a current diagnosis for this admission?: Yes   


Plan: 


Improve.  Discontinue fluids








(2) Aspiration pneumonia


Qualifiers: 


   Aspiration pneumonia type: due to vomit   Laterality: right   Lung location: 

lower lobe of lung   Qualified Code(s): J69.0 - Pneumonitis due to inhalation 

of food and vomit   


Is this a current diagnosis for this admission?: Yes   


Plan: 


Continue Augmentin.  Chest x-ray results noted. Discharge planning to placement








(3) Fecal impaction


Is this a current diagnosis for this admission?: Yes   


Plan: 


Resolved








(4) Hypothyroidism


Qualifiers: 


   Hypothyroidism type: acquired   Qualified Code(s): E03.9 - Hypothyroidism, 

unspecified   


Is this a current diagnosis for this admission?: Yes   


Plan: 


Continue outpatient regimen








(5) Dementia


Qualifiers: 


   Alzheimer's disease onset: unspecified onset   Dementia behavioral 

disturbance: without behavioral disturbance 


Is this a current diagnosis for this admission?: Yes   


Plan: 


Supportive care.  Continue Namenda and Exelon








(6) Altered mental status


Qualifiers: 


   Altered mental status type: somnolence   Qualified Code(s): R40.0 - 

Somnolence   


Is this a current diagnosis for this admission?: Yes   


Plan: 


CT of the brain results noted.  Appears to be at baseline








(7) Abdominal pain


Qualifiers: 


   Abdominal location: generalized   Qualified Code(s): R10.84 - Generalized 

abdominal pain   


Is this a current diagnosis for this admission?: Yes   


Plan: 


Resolved








(8) Hypokalemia


Is this a current diagnosis for this admission?: Yes   


Plan: 


Replaced








- Time


Time Spent with patient: Less than 15 minutes


Medications reviewed and adjusted accordingly: Yes


Anticipated discharge: SNF


Within: when bed available





- Inpatient Certification


Based on my medical assessment, after consideration of the patient's 

comorbidities, presenting symptoms, or acuity I expect that the services needed 

warrant INPATIENT care.: Yes


I certify that my determination is in accordance with my understanding of 

Medicare's requirements for reasonable and necessary INPATIENT services [42 CFR 

412.3e].: Yes


Medical Necessity: Need Close Monitoring Due to Risk of Patient Decompensation

## 2018-05-07 VITALS — DIASTOLIC BLOOD PRESSURE: 59 MMHG | SYSTOLIC BLOOD PRESSURE: 96 MMHG

## 2018-05-07 RX ADMIN — CALCIUM CARBONATE-CHOLECALCIFEROL TAB 250 MG-125 UNIT SCH TAB: 250-125 TAB at 10:04

## 2018-05-07 RX ADMIN — NYSTATIN SCH APPLIC: 100000 POWDER TOPICAL at 10:05

## 2018-05-07 RX ADMIN — OMEGA-3-ACID ETHYL ESTERS SCH GM: 900 CAPSULE ORAL at 10:04

## 2018-05-07 RX ADMIN — AMOXICILLIN AND CLAVULANATE POTASSIUM SCH TAB: 500; 125 TABLET, FILM COATED ORAL at 06:22

## 2018-05-07 RX ADMIN — RIVASTIGMINE TARTRATE SCH MG: 1.5 CAPSULE ORAL at 10:04

## 2018-05-07 RX ADMIN — ASPIRIN 325 MG ORAL TABLET SCH MG: 325 PILL ORAL at 10:04

## 2018-05-07 RX ADMIN — LACTULOSE SCH GM: 20 SOLUTION ORAL at 09:45

## 2018-05-07 RX ADMIN — ENOXAPARIN SODIUM SCH MG: 40 INJECTION SUBCUTANEOUS at 10:04

## 2018-05-07 RX ADMIN — LEVOTHYROXINE SODIUM SCH MG: 100 TABLET ORAL at 06:22

## 2018-05-07 RX ADMIN — MULTIVITAMIN TABLET SCH TAB: TABLET at 10:04

## 2018-05-07 RX ADMIN — DEXTROSE, SODIUM CHLORIDE, AND POTASSIUM CHLORIDE PRN ML: 5; .45; .3 INJECTION INTRAVENOUS at 06:22

## 2018-05-07 RX ADMIN — LANSOPRAZOLE SCH MG: 30 TABLET, ORALLY DISINTEGRATING, DELAYED RELEASE ORAL at 06:22

## 2018-05-07 NOTE — PDOC TRANSFER SUMMARY
General





- Admit/Disc Date/PCP


Admission Date/Primary Care Provider: 


  05/02/18 01:38





  





Discharge Date: 05/07/18





- Discharge Diagnosis


(1) Aspiration pneumonia


Is this a current diagnosis for this admission?: Yes   





(2) Acute respiratory failure


Is this a current diagnosis for this admission?: Yes   





(3) Fecal impaction


Is this a current diagnosis for this admission?: Yes   





(4) Hypernatremia


Is this a current diagnosis for this admission?: Yes   





(5) Hypothyroidism


Is this a current diagnosis for this admission?: Yes   





(6) Dementia


Is this a current diagnosis for this admission?: Yes   





(7) Altered mental status


Is this a current diagnosis for this admission?: Yes   





(8) Abdominal pain


Is this a current diagnosis for this admission?: Yes   





(9) Hypokalemia


Is this a current diagnosis for this admission?: Yes   





- Additional Information


Resuscitation Status: Do Not Resuscitate


Discharge Diet: Regular


Discharge Activity: Activity As Tolerated


Home Medications: 








Acetaminophen [Tylenol 325 mg Tablet] 650 mg PO Q6HP PRN 05/02/18 


Aspirin [Aspirin 325 mg Tablet] 325 mg PO DAILY 05/02/18 


Calcium Carbonate/Vitamin D3 [Calcium 600-Vit D3 200 Tablet] 1 each PO DAILY 05/ 02/18 


Cold Cream/Zinc Oxide/Star/Joni [Dermacloud Ointment] 430 gm TP ASDIR PRN 05/02/ 18 


Divalproex Sodium [Depakote  mg Tablet] 750 mg PO QHS 05/02/18 


Guaifenesin [Robafen] 200 mg PO Q4HP PRN 05/02/18 


Lactulose [Constulose 10 gm/15 mL Oral Solution] 10 gm PO DAILY 05/02/18 


Levothyroxine Sodium [Synthroid 0.1 mg Tablet] 0.1 mg PO Q6AM 05/02/18 


Magnesium Hydroxide [Milk of Magnesia 30 ml Udcup] 30 ml PO DAILYP PRN 05/02/18 


Meloxicam [Mobic] 7.5 mg PO DAILYP PRN 05/02/18 


Memantine HCl [Namenda Xr] 28 mg PO DAILY 05/02/18 


Multivitamin [Tab-A-Duncan] 1 each PO DAILY 05/02/18 


Neomy Sulf/Bacitrac Zn/Poly [Triple Antibiotic Ointment] 1 applic TP DAILYP PRN 

05/02/18 


Wesley-3S/Dha/Epa/Fish Oil [Fish Oil 1,200 mg Softgel] 1 each PO DAILY 05/02/18 


Omeprazole 40 mg PO QAM 05/02/18 


Pravastatin Sodium [Pravachol] 40 mg PO DAILY 05/02/18 


Rivastigmine Tartrate [Rivastigmine] 3 mg PO BIDACBS 05/02/18 


Saccharomyces Boulardii [Probiotic] 250 mg PO DAILY 05/02/18 


Amox Tr/Potassium Clavulanate [Augmentin  "500" Tablet] 1 tab PO Q8 5 Days #15 

tablet 05/07/18 


Nystatin [Mycostatin Topical Powder 15 gm] 1 applic TP BID  bottle 05/07/18 


Sennosides/Docusate 8.6-50 mg [Senna Plus Tablet] 2 each PO QHS  tablet 05/07/ 18 











History of Present Illness


Admission Date/PCP: 


  05/02/18 01:38





  





History of Present Illness: 


JENNIFER ISLAS is a 73 year old female who is a nursing home resident brought 

with chief complaint from nursing home of altered mental status.  Since patient 

has underlying advanced dementia and cognitive impairment history taking was 

unobtainable. The history was obtained from ER attending notes.  As per ER 

attending note patient was not acting at her baseline and no such explanation 

was provided as to the specifics of confusion.  CT of abdomen revealed 

extensive diverticulosis without diverticulitis and fecal impaction.  After ER 

attending disimpacted her, patient had an episode of vomiting and possibly 

aspiration.  Patient desaturated to lower 80s. Patient was admitted under the 

hospitalist service.








Hospital Course


Hospital Course: 





Patient was admitted under the hospitalist service since patient experienced an 

aspiration episode that provoke acute respiratory failure. Patient was treated 

with IV antibiotic with further improvement. Patient was transitioned to 

Augmentin 500 mg 1 p.o. 3 times daily.  On discharge she is to continue these 

medication for the following 5 days.She was weaned off oxygen and has been 

saturating well at room air. Patient was evaluated by speech therapy and is to 

continue regular diet. All electrolyte abnormalities were corrected.  

Hypernatremia responded to IV hydration and it was deemed to be due to volume 

contraction.  Constipation resolved with bowel regimen.  Patient was evaluated 

by physical therapy however patient did not participate and basically remained 

on bedrest.  Case management follow through with patient's son and patient is 

to return back to the [facility].  Since patient had achieved maximum benefit of 

hospitalization stay prompted to discharge.  Recommend incoming facility to 

work with patient's son regarding end-of-life issues including not to intervene 

or comfort measures





Physical Exam


Vital Signs: 


 











Temp Pulse Resp BP Pulse Ox


 


 99.2 F   105 H  17   104/60   95 


 


 05/06/18 23:21  05/06/18 23:21  05/06/18 23:21  05/07/18 00:00  05/06/18 23:21








 Intake & Output











 05/06/18 05/07/18 05/08/18





 06:59 06:59 06:59


 


Intake Total 3406 3279 


 


Balance 3406 3279 


 


Weight 66.4 kg 66.1 kg 











General appearance: PRESENT: no acute distress, cooperative, well-developed, 

well-nourished


Head exam: PRESENT: atraumatic, normocephalic


Eye exam: PRESENT: conjunctiva pink, EOMI, PERRLA


Ear exam: PRESENT: normal external ear exam


Mouth exam: PRESENT: moist


Neck exam: PRESENT: full ROM.  ABSENT: JVD, lymphadenopathy, tenderness


Respiratory exam: PRESENT: clear to auscultation jose


Cardiovascular exam: PRESENT: RRR, systolic murmur.  ABSENT: diastolic murmur


Vascular exam: PRESENT: normal capillary refill


GI/Abdominal exam: PRESENT: normal bowel sounds, soft.  ABSENT: tenderness


Extremities exam: PRESENT: full ROM.  ABSENT: pedal edema, tenderness


Musculoskeletal exam: ABSENT: ambulatory


Neurological exam: PRESENT: alert, awake.  ABSENT: oriented to person, oriented 

to place, oriented to time, oriented to situation


Psychiatric exam: PRESENT: appropriate affect, normal mood


Skin exam: PRESENT: intact, normal color





Results


Laboratory Results: 


 





 05/05/18 05:05 





 05/05/18 05:05 








Impressions: 


 





Abdomen/Pelvis CT  05/01/18 19:46


IMPRESSION:  1.  Stable hepatic cysts.


2.  Mild ductal dilatation suggesting prior cholecystectomy.  Surgical clips 

are not seen, however.


3.  Thickening of both adrenal glands that appears stable.


4.  Extensive diverticulosis coli with no acute inflammatory changes.


5.  Considerable stool.  A large amount of stool in the rectum raises the 

possibility of fecal impaction.


6.  Scoliosis and mild lumbar degenerative disc changes.


 








Head CT  05/02/18 00:00


IMPRESSION:  MICROVASCULAR ISCHEMIA AND GENERALIZED ATROPHY. NO ACUTE IMAGING 

FINDINGS IN THE BRAIN


EVIDENCE OF ACUTE STROKE: NO.


 








Modified Barium Swallow  05/03/18 00:00


IMPRESSION:  DEEP LARYNGEAL PENETRATION WITHOUT ASPIRATION.PLEASE SEE SPEECH 

PATHOLOGIST REPORT FOR OTHER FINDINGS AND RECOMMENDATIONS.


 








Chest X-Ray  05/04/18 00:00


IMPRESSION:  Pulmonary vascular prominence with trace pleural effusions and 

mild bibasilar atelectasis


 














Transfer Plan





- Disposition


Transfer Plan: 





Discharge to [facility] facility





- Time Spent with Patient


Time spent with patient: Greater than 30 Minutes





Qualifiers





- *


PATIENT BEING DISCHARGED WITH ANY OF THE FOLLOWING DIAGNOSIS: No

## 2018-06-03 ENCOUNTER — HOSPITAL ENCOUNTER (INPATIENT)
Dept: HOSPITAL 62 - ER | Age: 73
LOS: 8 days | Discharge: NURSING, REHAB OR LONG-TERM CARE | DRG: 690 | End: 2018-06-11
Attending: INTERNAL MEDICINE | Admitting: INTERNAL MEDICINE
Payer: MEDICARE

## 2018-06-03 DIAGNOSIS — Z16.12: ICD-10-CM

## 2018-06-03 DIAGNOSIS — L22: ICD-10-CM

## 2018-06-03 DIAGNOSIS — Z79.82: ICD-10-CM

## 2018-06-03 DIAGNOSIS — F32.9: ICD-10-CM

## 2018-06-03 DIAGNOSIS — Z90.710: ICD-10-CM

## 2018-06-03 DIAGNOSIS — K21.9: ICD-10-CM

## 2018-06-03 DIAGNOSIS — Z79.899: ICD-10-CM

## 2018-06-03 DIAGNOSIS — E03.9: ICD-10-CM

## 2018-06-03 DIAGNOSIS — R09.02: ICD-10-CM

## 2018-06-03 DIAGNOSIS — E87.6: ICD-10-CM

## 2018-06-03 DIAGNOSIS — R00.0: ICD-10-CM

## 2018-06-03 DIAGNOSIS — N39.0: Primary | ICD-10-CM

## 2018-06-03 DIAGNOSIS — F03.90: ICD-10-CM

## 2018-06-03 DIAGNOSIS — B96.20: ICD-10-CM

## 2018-06-03 DIAGNOSIS — Z82.49: ICD-10-CM

## 2018-06-03 DIAGNOSIS — I10: ICD-10-CM

## 2018-06-03 DIAGNOSIS — E78.5: ICD-10-CM

## 2018-06-03 LAB
ABSOLUTE LYMPHOCYTES# (MANUAL): 0.6 10^3/UL (ref 0.5–4.7)
ABSOLUTE MONOCYTES # (MANUAL): 0.8 10^3/UL (ref 0.1–1.4)
ABSOLUTE NEUTROPHILS# (MANUAL): 14.7 10^3/UL (ref 1.7–8.2)
ADD MANUAL DIFF: YES
ALBUMIN SERPL-MCNC: 3.6 G/DL (ref 3.5–5)
ALP SERPL-CCNC: 47 U/L (ref 38–126)
ALT SERPL-CCNC: 11 U/L (ref 9–52)
ANION GAP SERPL CALC-SCNC: 10 MMOL/L (ref 5–19)
APPEARANCE UR: (no result)
APTT PPP: (no result) S
AST SERPL-CCNC: 38 U/L (ref 14–36)
BASOPHILS NFR BLD MANUAL: 0 % (ref 0–2)
BILIRUB DIRECT SERPL-MCNC: 0.5 MG/DL (ref 0–0.4)
BILIRUB SERPL-MCNC: 0.7 MG/DL (ref 0.2–1.3)
BILIRUB UR QL STRIP: NEGATIVE
BUN SERPL-MCNC: 20 MG/DL (ref 7–20)
CALCIUM: 9.1 MG/DL (ref 8.4–10.2)
CHLORIDE SERPL-SCNC: 104 MMOL/L (ref 98–107)
CO2 SERPL-SCNC: 27 MMOL/L (ref 22–30)
EOSINOPHIL NFR BLD MANUAL: 0 % (ref 0–6)
ERYTHROCYTE [DISTWIDTH] IN BLOOD BY AUTOMATED COUNT: 13.2 % (ref 11.5–14)
GLUCOSE SERPL-MCNC: 122 MG/DL (ref 75–110)
GLUCOSE UR STRIP-MCNC: NEGATIVE MG/DL
HCT VFR BLD CALC: 35.4 % (ref 36–47)
HGB BLD-MCNC: 12.1 G/DL (ref 12–15.5)
KETONES UR STRIP-MCNC: NEGATIVE MG/DL
MCH RBC QN AUTO: 30.1 PG (ref 27–33.4)
MCHC RBC AUTO-ENTMCNC: 34.1 G/DL (ref 32–36)
MCV RBC AUTO: 88 FL (ref 80–97)
MONOCYTES % (MANUAL): 5 % (ref 3–13)
NITRITE UR QL STRIP: POSITIVE
OVALOCYTES BLD QL SMEAR: (no result)
PH UR STRIP: 6 [PH] (ref 5–9)
PLATELET # BLD: 296 10^3/UL (ref 150–450)
PLATELET COMMENT: ADEQUATE
POIKILOCYTOSIS BLD QL SMEAR: (no result)
POTASSIUM SERPL-SCNC: 3.9 MMOL/L (ref 3.6–5)
PROT SERPL-MCNC: 7.6 G/DL (ref 6.3–8.2)
PROT UR STRIP-MCNC: 100 MG/DL
RBC # BLD AUTO: 4.02 10^6/UL (ref 3.72–5.28)
SEGMENTED NEUTROPHILS % (MAN): 91 % (ref 42–78)
SODIUM SERPL-SCNC: 141.3 MMOL/L (ref 137–145)
SP GR UR STRIP: 1.01
TOTAL CELLS COUNTED BLD: 100
UROBILINOGEN UR-MCNC: 2 MG/DL (ref ?–2)
VARIANT LYMPHS NFR BLD MANUAL: 4 % (ref 13–45)
WBC # BLD AUTO: 16.2 10^3/UL (ref 4–10.5)

## 2018-06-03 PROCEDURE — 87040 BLOOD CULTURE FOR BACTERIA: CPT

## 2018-06-03 PROCEDURE — 83605 ASSAY OF LACTIC ACID: CPT

## 2018-06-03 PROCEDURE — 83880 ASSAY OF NATRIURETIC PEPTIDE: CPT

## 2018-06-03 PROCEDURE — 93010 ELECTROCARDIOGRAM REPORT: CPT

## 2018-06-03 PROCEDURE — 84100 ASSAY OF PHOSPHORUS: CPT

## 2018-06-03 PROCEDURE — 87493 C DIFF AMPLIFIED PROBE: CPT

## 2018-06-03 PROCEDURE — 36415 COLL VENOUS BLD VENIPUNCTURE: CPT

## 2018-06-03 PROCEDURE — 83735 ASSAY OF MAGNESIUM: CPT

## 2018-06-03 PROCEDURE — 87088 URINE BACTERIA CULTURE: CPT

## 2018-06-03 PROCEDURE — 74230 X-RAY XM SWLNG FUNCJ C+: CPT

## 2018-06-03 PROCEDURE — 94640 AIRWAY INHALATION TREATMENT: CPT

## 2018-06-03 PROCEDURE — 51702 INSERT TEMP BLADDER CATH: CPT

## 2018-06-03 PROCEDURE — 36600 WITHDRAWAL OF ARTERIAL BLOOD: CPT

## 2018-06-03 PROCEDURE — 80202 ASSAY OF VANCOMYCIN: CPT

## 2018-06-03 PROCEDURE — 93005 ELECTROCARDIOGRAM TRACING: CPT

## 2018-06-03 PROCEDURE — 81001 URINALYSIS AUTO W/SCOPE: CPT

## 2018-06-03 PROCEDURE — L3908 WHO COCK-UP NONMOLDE PRE OTS: HCPCS

## 2018-06-03 PROCEDURE — 80048 BASIC METABOLIC PNL TOTAL CA: CPT

## 2018-06-03 PROCEDURE — 85025 COMPLETE CBC W/AUTO DIFF WBC: CPT

## 2018-06-03 PROCEDURE — 96365 THER/PROPH/DIAG IV INF INIT: CPT

## 2018-06-03 PROCEDURE — 80164 ASSAY DIPROPYLACETIC ACD TOT: CPT

## 2018-06-03 PROCEDURE — 82962 GLUCOSE BLOOD TEST: CPT

## 2018-06-03 PROCEDURE — 99285 EMERGENCY DEPT VISIT HI MDM: CPT

## 2018-06-03 PROCEDURE — 80053 COMPREHEN METABOLIC PANEL: CPT

## 2018-06-03 PROCEDURE — 71045 X-RAY EXAM CHEST 1 VIEW: CPT

## 2018-06-03 PROCEDURE — 84484 ASSAY OF TROPONIN QUANT: CPT

## 2018-06-03 PROCEDURE — 87186 SC STD MICRODIL/AGAR DIL: CPT

## 2018-06-03 PROCEDURE — 87086 URINE CULTURE/COLONY COUNT: CPT

## 2018-06-03 PROCEDURE — 84443 ASSAY THYROID STIM HORMONE: CPT

## 2018-06-03 RX ADMIN — NYSTATIN SCH APPLIC: 100000 POWDER TOPICAL at 18:00

## 2018-06-03 RX ADMIN — LACTULOSE SCH GM: 20 SOLUTION ORAL at 17:52

## 2018-06-03 RX ADMIN — ACETAMINOPHEN PRN MG: 325 TABLET ORAL at 19:05

## 2018-06-03 NOTE — RADIOLOGY REPORT (SQ)
EXAM DESCRIPTION:  CHEST SINGLE VIEW



COMPLETED DATE/TIME:  6/3/2018 2:14 pm



REASON FOR STUDY:  Fever, recent admission for aspiration pneumonia



COMPARISON:  5/4/2018



EXAM PARAMETERS:  NUMBER OF VIEWS: One view.

TECHNIQUE: Single frontal radiographic view of the chest acquired.

RADIATION DOSE: NA

LIMITATIONS: None.



FINDINGS:  LUNGS AND PLEURA: Basilar atelectasis.  No acute opacities.

MEDIASTINUM AND HILAR STRUCTURES: Chronic elevation of the right hemidiaphragm.

HEART AND VASCULAR STRUCTURES: Heart enlarged without failure.

BONES: No acute findings.

HARDWARE: None in the chest.

OTHER: No other significant finding.



IMPRESSION:  Mild basilar atelectasis.



TECHNICAL DOCUMENTATION:  JOB ID:  [id number]

 2011 [employer]- All Rights Reserved



Reading location - IP/workstation name: [email or web address]

## 2018-06-03 NOTE — ER DOCUMENT REPORT
ED General





<SHARITA ANN - Last Filed: 06/03/18 16:43>





- General


Mode of Arrival: Ambulatory


Information source: Patient


TRAVEL OUTSIDE OF THE [place] IN LAST 30 DAYS: No





<MARSHA HOPKINS - Last Filed: 06/03/18 17:04>





- General


Chief Complaint: Altered Mental Status


Stated Complaint: ALTERED MENTAL STATUS


Time Seen by Provider: 06/03/18 13:34


Notes: 





Patient is a 73 year old female that presents to the emergency department today 

with complaints of altered mental status. According to EMS the patient vomited 

and then became unresponsive. Patient was febrile for EMS and was given rectal 

tylenol. History is limited. (MARSHA HOPKINS)





- Related Data


Allergies/Adverse Reactions: 


 





No Known Allergies Allergy (Verified 11/09/17 15:12)


 











Past Medical History





- General


Information source: Patient





- Social History


Smoking Status: Unknown if Ever Smoked


Family History: Reviewed & Not Pertinent





- Past Medical History


Cardiac Medical History: Reports: Hx Hypercholesterolemia, Hx Hypertension


GI Medical History: Reports: Hx Gastroesophageal Reflux Disease


Psychiatric Medical History: Reports: Hx Dementia, Hx Depression


Past Surgical History: Reports: Hx Hysterectomy, Hx Orthopedic Surgery - carpal 

tunnel





- Immunizations


Hx Diphtheria, Pertussis, Tetanus Vaccination: Yes





<MARSHA HOPKINS - Last Filed: 06/03/18 17:04>





Review of Systems





- Review of Systems


-: Yes ROS unobtainable due to patient's medical condition





<MARSHA HOPKINS - Last Filed: 06/03/18 17:04>





Physical Exam





- Vital signs


Interpretation: Normal





- General


General appearance: Other - non-verbal





- HEENT


Head: Normocephalic, Atraumatic


Eyes: Normal


Pupils: PERRL





- Respiratory


Respiratory status: No respiratory distress


Chest status: Nontender


Breath sounds: Normal


Chest palpation: Normal





- Cardiovascular


Rhythm: Regular


Heart sounds: Normal auscultation


Murmur: Yes - Loud, loudest over right chest





- Abdominal


Inspection: Normal


Distension: No distension


Bowel sounds: Normal


Tenderness: Nontender


Organomegaly: No organomegaly





- Back


Back: Normal, Nontender





- Extremities


General upper extremity: Normal inspection, Nontender.  No: Edema


General lower extremity: Normal inspection, Nontender.  No: Edema





- Neurological


Neuro grossly intact: No - Severly demented. Non-verbal at baseline.





- Psychological


Associated symptoms: Other - unable to assess





- Skin


Skin Temperature: Warm


Skin Moisture: Dry


Skin Color: Normal





<MARSHA HOPKINS - Last Filed: 06/03/18 17:04>





- Vital signs


Vitals: 


 











Temp Resp BP Pulse Ox


 


 102.0 F H  22 H  88/74 L  93 


 


 06/03/18 13:30  06/03/18 13:30  06/03/18 13:30  06/03/18 13:30














Course





- Laboratory


Result Diagrams: 


 06/03/18 12:30





 06/03/18 12:30





- Diagnostic Test


Radiology reviewed: Image reviewed, Reports reviewed - Mild basilar atelectasis





- EKG Interpretation by Me


EKG shows normal: Sinus rhythm, Axis, QRS Complexes, ST-T Waves.  abnormal: 

Intervals -  Prolonged QT interval


Rate: Normal - 93


Rhythm: NSR


Voltage: Consistant with LVH


P Waves: LAE


When compared to previous EKG there are: No significant change





- Consults


  ** Dr. Fritz


Time consulted: 16:40


Consulted provider: will come to ER





<SHARITA ANN - Last Filed: 06/03/18 16:43>





- Laboratory


Result Diagrams: 


 06/03/18 12:30





 06/03/18 12:30





<MARSHA HOPKINS - Last Filed: 06/03/18 17:04>





- Vital Signs


Vital signs: 


 











Temp Pulse Resp BP Pulse Ox


 


 98.8 F      17   113/70   95 


 


 06/03/18 15:12     06/03/18 15:02  06/03/18 15:02  06/03/18 15:02














- Laboratory


Laboratory results interpreted by me: 


 











  06/03/18 06/03/18 06/03/18





  12:30 12:30 15:16


 


WBC  16.2 H  


 


Hct  35.4 L  


 


Seg Neuts % (Manual)  91 H  


 


Lymphocytes % (Manual)  4 L  


 


Abs Neuts (Manual)  14.7 H  


 


Glucose   122 H 


 


Direct Bilirubin   0.5 H 


 


AST   38 H 


 


Urine Protein    100 H


 


Urine Blood    LARGE H


 


Urine Nitrite    POSITIVE H


 


Urine Urobilinogen    2.0 H


 


Ur Leukocyte Esterase    LARGE H


 


Valproic Acid   < 10.0 L 














Discharge





- Discharge


Admitting Provider: Hospitalist


Unit Admitted: Telemetry





<SHARITA ANN - Last Filed: 06/03/18 16:43>





<MARSHA HOPKINS - Last Filed: 06/03/18 17:04>





- Discharge


Clinical Impression: 


Urinary tract infection


Qualifiers:


 Urinary tract infection type: site unspecified Hematuria presence: with 

hematuria Qualified Code(s): N39.0 - Urinary tract infection, site not specified





Leukocytosis


Qualifiers:


 Leukocytosis type: unspecified Qualified Code(s): D72.829 - Elevated white 

blood cell count, unspecified





Fever


Qualifiers:


 Fever type: unspecified Qualified Code(s): R50.9 - Fever, unspecified





Dementia


Qualifiers:


 Dementia type: unspecified type Dementia behavioral disturbance: without 

behavioral disturbance Qualified Code(s): F03.90 - Unspecified dementia without 

behavioral disturbance





Scribe Attestation: 





06/03/18 14:08


I personally performed the services described in the documentation, reviewed 

and edited the documentation which was dictated to the scribe in my presence, 

and it accurately records my words and actions. (SHARITA ANN)





Scribe Documentation





- Scribe


Written by Scribe:: Alva Talamantes, 6/3/2018 1704


acting as scribe for :: Edin





<MARSHA HOPKINS - Last Filed: 06/03/18 17:04>

## 2018-06-03 NOTE — PDOC H&P
History of Present Illness


Admission Date/PCP: 


  06/03/18 16:54





  NO LOCAL MD





Patient complains of: Fever


History of Present Illness: 


JENNIFER BROWN is a 73 year old female with





Dementia lines in the [facility]


Hypothyroidism


Hypertension


Hyperlipidemia


GERD


Depression





Healthcare POA is her son Kee Brown ph 


He states that she is to be a FULL CODE.





The patient was sent to the ER due to fever and was found to have a UTI.


She was treated with IV fluids and Levaquin and referred for admission.





Her son is at the bedside and plan of care was discussed with him.


The patient is awake and alert, pleasantly demented, does not follow commands.


No facial droop, not in distress.











Past Medical History


Cardiac Medical History: Reports: Hyperlipidema, Hypertension


GI Medical History: Reports: Gastroesophageal Reflux Disease


Psychiatric Medical History: Reports: Dementia, Depression





Past Surgical History


Past Surgical History: Reports: Hysterectomy, Orthopedic Surgery - carpal tunnel





Social History


Smoking Status: Unknown if Ever Smoked


Frequency of Alcohol Use: None


Hx Recreational Drug Use: No


Drugs: None


Hx Prescription Drug Abuse: No





- Advance Directive


Resuscitation Status: Full Code





Family History


Family History: Hypertension


Parental Family History Reviewed: Yes


Children Family History Reviewed: Yes


Sibling(s) Family History Reviewed.: Yes





Medication/Allergy


Home Medications: 








Acetaminophen [Tylenol 325 mg Tablet] 650 mg PO Q6HP PRN 05/02/18 


Aspirin [Aspirin 325 mg Tablet] 325 mg PO DAILY 05/02/18 


Calcium Carbonate/Vitamin D3 [Calcium 600-Vit D3 200 Tablet] 1 each PO DAILY 05/ 02/18 


Cold Cream/Zinc Oxide/Star/Joni [Dermacloud Ointment] 430 gm TP ASDIR PRN 05/02/ 18 


Divalproex Sodium [Depakote  mg Tablet] 750 mg PO QHS 05/02/18 


Guaifenesin [Robafen] 200 mg PO Q4HP PRN 05/02/18 


Lactulose [Constulose 10 gm/15 mL Oral Solution] 10 gm PO DAILY 05/02/18 


Levothyroxine Sodium [Synthroid 0.1 mg Tablet] 0.1 mg PO Q6AM 05/02/18 


Magnesium Hydroxide [Milk of Magnesia 30 ml Udcup] 30 ml PO DAILYP PRN 05/02/18 


Meloxicam [Mobic] 7.5 mg PO DAILYP PRN 05/02/18 


Memantine HCl [Namenda Xr] 28 mg PO DAILY 05/02/18 


Multivitamin [Tab-A-Duncan] 1 each PO DAILY 05/02/18 


Neomy Sulf/Bacitrac Zn/Poly [Triple Antibiotic Ointment] 1 applic TP DAILYP PRN 

05/02/18 


[place]-3S/Dha/Epa/Fish Oil [Fish Oil 1,200 mg Softgel] 1 each PO DAILY 05/02/18 


Omeprazole 40 mg PO QAM 05/02/18 


Pravastatin Sodium [Pravachol] 40 mg PO DAILY 05/02/18 


Rivastigmine Tartrate [Rivastigmine] 3 mg PO BIDACBS 05/02/18 


Saccharomyces Boulardii [Probiotic] 250 mg PO DAILY 05/02/18 


Amox Tr/Potassium Clavulanate [Augmentin  "500" Tablet] 1 tab PO Q8 5 Days #15 

tablet 05/07/18 


Nystatin [Mycostatin Topical Powder 15 gm] 1 applic TP BID  bottle 05/07/18 


Sennosides/Docusate 8.6-50 mg [Senna Plus Tablet] 2 each PO QHS  tablet 05/07/ 18 








Allergies/Adverse Reactions: 


 





No Known Allergies Allergy (Verified 11/09/17 15:12)


 











Review of Systems


ROS unobtainable: Due to mental status





Physical Exam


Vital Signs: 


 











Temp Pulse Resp BP Pulse Ox


 


 98.8 F      22 H  125/89 H  96 


 


 06/03/18 15:12     06/03/18 17:00  06/03/18 17:00  06/03/18 17:00








 Intake & Output











 06/02/18 06/03/18 06/04/18





 06:59 06:59 06:59


 


Weight   54.431 kg











General appearance: PRESENT: no acute distress, well-developed


Head exam: PRESENT: normocephalic


Eye exam: PRESENT: PERRLA.  ABSENT: scleral icterus


Ear exam: PRESENT: normal external ear exam


Neck exam: ABSENT: tracheal deviation


Respiratory exam: PRESENT: symmetrical, unlabored.  ABSENT: wheezes


Cardiovascular exam: PRESENT: RRR


GI/Abdominal exam: PRESENT: normal bowel sounds, soft.  ABSENT: tenderness


Rectal exam: PRESENT: deferred


Gentrourinary exam: PRESENT: indwelling catheter


Extremities exam: ABSENT: pedal edema


Neurological exam: PRESENT: alert, awake


Skin exam: PRESENT: rash - groin diaper rash





Results


Impressions: 


 





Chest X-Ray  06/03/18 13:51


IMPRESSION:  Mild basilar atelectasis.


 














Assessment & Plan





- Diagnosis


(1) Urinary tract infection


Qualifiers: 


   Urinary tract infection type: site unspecified   Hematuria presence: with 

hematuria   Qualified Code(s): N39.0 - Urinary tract infection, site not 

specified; R31.9 - Hematuria, unspecified; R31.9 - Hematuria, unspecified   


Is this a current diagnosis for this admission?: Yes   


Plan: 


IV fluids, antibiotics, follow up on cultures.








(2) Diaper rash


Is this a current diagnosis for this admission?: Yes   


Plan: 


Local care and barrier cream








(3) Dementia


Qualifiers: 


   Dementia type: unspecified type   Dementia behavioral disturbance: without 

behavioral disturbance   Qualified Code(s): F03.90 - Unspecified dementia 

without behavioral disturbance   


Is this a current diagnosis for this admission?: Yes   


Plan: 


Stable- continue outpatient meds.


Pureed diet








(4) Hypothyroidism


Qualifiers: 


   Hypothyroidism type: acquired   Qualified Code(s): E03.9 - Hypothyroidism, 

unspecified   


Is this a current diagnosis for this admission?: Yes   


Plan: 


Synthroid








(5) DVT prophylaxis


Is this a current diagnosis for this admission?: Yes   


Plan: 


S/C Lovenox








(6) Full code status


Is this a current diagnosis for this admission?: Yes   





- Time


Time Spent: 50 to 70 Minutes

## 2018-06-03 NOTE — EKG REPORT
SEVERITY:- ABNORMAL ECG -

SINUS RHYTHM

LEFT ATRIAL ABNORMALITY

PROBABLE LEFT VENTRICULAR HYPERTROPHY

BORDERLINE PROLONGED QT INTERVAL

:

Confirmed by: Noman Braga 03-Jun-2018 22:55:06

## 2018-06-04 LAB
ADD MANUAL DIFF: NO
ANION GAP SERPL CALC-SCNC: 10 MMOL/L (ref 5–19)
BASOPHILS # BLD AUTO: 0 10^3/UL (ref 0–0.2)
BASOPHILS NFR BLD AUTO: 0.3 % (ref 0–2)
BUN SERPL-MCNC: 15 MG/DL (ref 7–20)
CALCIUM: 8.6 MG/DL (ref 8.4–10.2)
CHLORIDE SERPL-SCNC: 111 MMOL/L (ref 98–107)
CO2 SERPL-SCNC: 23 MMOL/L (ref 22–30)
EOSINOPHIL # BLD AUTO: 0 10^3/UL (ref 0–0.6)
EOSINOPHIL NFR BLD AUTO: 0.1 % (ref 0–6)
ERYTHROCYTE [DISTWIDTH] IN BLOOD BY AUTOMATED COUNT: 12.9 % (ref 11.5–14)
GLUCOSE SERPL-MCNC: 92 MG/DL (ref 75–110)
HCT VFR BLD CALC: 32.2 % (ref 36–47)
HGB BLD-MCNC: 10.8 G/DL (ref 12–15.5)
LYMPHOCYTES # BLD AUTO: 0.7 10^3/UL (ref 0.5–4.7)
LYMPHOCYTES NFR BLD AUTO: 5.9 % (ref 13–45)
MCH RBC QN AUTO: 29.9 PG (ref 27–33.4)
MCHC RBC AUTO-ENTMCNC: 33.7 G/DL (ref 32–36)
MCV RBC AUTO: 89 FL (ref 80–97)
MONOCYTES # BLD AUTO: 1.3 10^3/UL (ref 0.1–1.4)
MONOCYTES NFR BLD AUTO: 11 % (ref 3–13)
NEUTROPHILS # BLD AUTO: 9.6 10^3/UL (ref 1.7–8.2)
NEUTS SEG NFR BLD AUTO: 82.7 % (ref 42–78)
PHOSPHATE SERPL-MCNC: 3.4 MG/DL (ref 2.5–4.5)
PLATELET # BLD: 215 10^3/UL (ref 150–450)
POTASSIUM SERPL-SCNC: 3.7 MMOL/L (ref 3.6–5)
RBC # BLD AUTO: 3.63 10^6/UL (ref 3.72–5.28)
SODIUM SERPL-SCNC: 144.4 MMOL/L (ref 137–145)
TOTAL CELLS COUNTED % (AUTO): 100 %
WBC # BLD AUTO: 11.6 10^3/UL (ref 4–10.5)

## 2018-06-04 RX ADMIN — PROBIOTIC PRODUCT - TAB SCH MG: TAB at 10:41

## 2018-06-04 RX ADMIN — MULTIVITAMIN TABLET SCH TAB: TABLET at 10:42

## 2018-06-04 RX ADMIN — ENOXAPARIN SODIUM SCH MG: 40 INJECTION SUBCUTANEOUS at 10:43

## 2018-06-04 RX ADMIN — SENNOSIDES, DOCUSATE SODIUM SCH EACH: 50; 8.6 TABLET, FILM COATED ORAL at 00:34

## 2018-06-04 RX ADMIN — LEVOTHYROXINE SODIUM SCH MG: 100 TABLET ORAL at 05:06

## 2018-06-04 RX ADMIN — VANCOMYCIN HYDROCHLORIDE SCH MG: 1 INJECTION, POWDER, LYOPHILIZED, FOR SOLUTION INTRAVENOUS at 21:04

## 2018-06-04 RX ADMIN — NYSTATIN SCH APPLIC: 100000 POWDER TOPICAL at 10:53

## 2018-06-04 RX ADMIN — NYSTATIN SCH APPLIC: 100000 POWDER TOPICAL at 17:51

## 2018-06-04 RX ADMIN — LACTULOSE SCH GM: 20 SOLUTION ORAL at 17:17

## 2018-06-04 RX ADMIN — ATORVASTATIN CALCIUM SCH MG: 10 TABLET, FILM COATED ORAL at 00:34

## 2018-06-04 RX ADMIN — SODIUM CHLORIDE PRN ML: 9 INJECTION, SOLUTION INTRAVENOUS at 01:08

## 2018-06-04 RX ADMIN — ASPIRIN 325 MG ORAL TABLET SCH MG: 325 PILL ORAL at 10:42

## 2018-06-04 RX ADMIN — RIVASTIGMINE TARTRATE SCH MG: 1.5 CAPSULE ORAL at 10:52

## 2018-06-04 RX ADMIN — LANSOPRAZOLE SCH MG: 30 TABLET, ORALLY DISINTEGRATING, DELAYED RELEASE ORAL at 05:06

## 2018-06-04 RX ADMIN — SENNOSIDES, DOCUSATE SODIUM SCH EACH: 50; 8.6 TABLET, FILM COATED ORAL at 21:01

## 2018-06-04 RX ADMIN — SODIUM CHLORIDE PRN ML: 9 INJECTION, SOLUTION INTRAVENOUS at 06:06

## 2018-06-04 RX ADMIN — DIVALPROEX SODIUM SCH MG: 250 TABLET, FILM COATED, EXTENDED RELEASE ORAL at 21:01

## 2018-06-04 RX ADMIN — MEROPENEM SCH GM: 1 INJECTION INTRAVENOUS at 17:51

## 2018-06-04 RX ADMIN — ACETAMINOPHEN PRN MG: 325 TABLET ORAL at 08:14

## 2018-06-04 RX ADMIN — SODIUM CHLORIDE PRN ML: 9 INJECTION, SOLUTION INTRAVENOUS at 00:33

## 2018-06-04 RX ADMIN — PROBIOTIC PRODUCT - TAB SCH MG: TAB at 17:17

## 2018-06-04 RX ADMIN — DIVALPROEX SODIUM SCH MG: 250 TABLET, FILM COATED, EXTENDED RELEASE ORAL at 00:34

## 2018-06-04 NOTE — EKG REPORT
SEVERITY:- ABNORMAL ECG -

SINUS TACHYCARDIA

PROBABLE LVH WITH SECONDARY REPOL ABNRM

PAC'S

:

Confirmed by: Carmen Benson MD 04-Jun-2018 19:55:57

## 2018-06-04 NOTE — PDOC PROGRESS REPORT
Subjective


Progress Note for:: 06/04/18


Subjective:: 





73 yr old female with severe dementia, resident of [facility].


Presented with fever and UTI.


Awake and alert, smiles at me but does not speak or follow commands.





Reason For Visit: 


URINARY TRACT INFECTION








Physical Exam


Vital Signs: 


 











Temp Pulse Resp BP Pulse Ox


 


 99.3 F   99   18   114/75   97 


 


 06/04/18 11:19  06/04/18 11:19  06/04/18 11:19  06/04/18 11:19  06/04/18 11:19








 Intake & Output











 06/03/18 06/04/18 06/05/18





 06:59 06:59 06:59


 


Intake Total  800 


 


Balance  800 


 


Weight  68.9 kg 











General appearance: PRESENT: no acute distress


Head exam: PRESENT: normocephalic


Eye exam: PRESENT: PERRLA


Mouth exam: PRESENT: moist


Teeth exam: PRESENT: edentulous


Respiratory exam: PRESENT: symmetrical, unlabored.  ABSENT: wheezes


Cardiovascular exam: PRESENT: RRR


GI/Abdominal exam: PRESENT: normal bowel sounds, soft.  ABSENT: tenderness


Rectal exam: PRESENT: deferred


Gentrourinary exam: PRESENT: indwelling catheter


Extremities exam: ABSENT: pedal edema


Neurological exam: PRESENT: alert, awake


Skin exam: PRESENT: rash - diaper rash over mons pubis, labia and inner thighs





Results


Laboratory Results: 


 





 06/04/18 05:19 





 06/04/18 05:19 





 











  06/04/18 06/04/18 06/04/18





  05:19 05:19 05:19


 


WBC  11.6 H  


 


RBC  3.63 L  


 


Hgb  10.8 L  


 


Hct  32.2 L  


 


MCV  89  


 


MCH  29.9  


 


MCHC  33.7  


 


RDW  12.9  


 


Plt Count  215  


 


Seg Neutrophils %  82.7 H  


 


Lymphocytes %  5.9 L  


 


Monocytes %  11.0  


 


Eosinophils %  0.1  


 


Basophils %  0.3  


 


Absolute Neutrophils  9.6 H  


 


Absolute Lymphocytes  0.7  


 


Absolute Monocytes  1.3  


 


Absolute Eosinophils  0.0  


 


Absolute Basophils  0.0  


 


Sodium   144.4 


 


Potassium   3.7 


 


Chloride   111 H 


 


Carbon Dioxide   23 


 


Anion Gap   10 


 


BUN   15 


 


Creatinine   0.50 L 


 


Est GFR ( Amer)   > 60 


 


Est GFR (Non-Af Amer)   > 60 


 


Glucose   92 


 


Calcium   8.6 


 


Phosphorus   3.4 


 


Magnesium   1.9 


 


TSH    2.44











Impressions: 


 





Chest X-Ray  06/03/18 13:51


IMPRESSION:  Mild basilar atelectasis.


 














Assessment & Plan





- Diagnosis


(1) Urinary tract infection


Qualifiers: 


   Urinary tract infection type: site unspecified   Hematuria presence: with 

hematuria   Qualified Code(s): N39.0 - Urinary tract infection, site not 

specified; R31.9 - Hematuria, unspecified; R31.9 - Hematuria, unspecified   


Is this a current diagnosis for this admission?: Yes   


Plan: 


IV fluids, day2 Rocephin 


urine culture growing GNR








(2) Diaper rash


Is this a current diagnosis for this admission?: Yes   


Plan: 


Local care and barrier cream








(3) Dementia


Qualifiers: 


   Dementia type: unspecified type   Dementia behavioral disturbance: without 

behavioral disturbance   Qualified Code(s): F03.90 - Unspecified dementia 

without behavioral disturbance   


Is this a current diagnosis for this admission?: Yes   


Plan: 


Stable- continue outpatient meds.


Pureed diet








(4) Hypothyroidism


Qualifiers: 


   Hypothyroidism type: acquired   Qualified Code(s): E03.9 - Hypothyroidism, 

unspecified   


Is this a current diagnosis for this admission?: Yes   


Plan: 


Synthroid








(5) DVT prophylaxis


Is this a current diagnosis for this admission?: Yes   


Plan: 


S/C Lovenox








(6) Full code status


Is this a current diagnosis for this admission?: Yes   





- Time


Time Spent with patient: 25-34 minutes

## 2018-06-04 NOTE — RADIOLOGY REPORT (SQ)
EXAM DESCRIPTION:  CHEST SINGLE VIEW



COMPLETED DATE/TIME:  6/4/2018 3:11 pm



REASON FOR STUDY:  Dyspnea



COMPARISON:  6/3/2018.



EXAM PARAMETERS:  NUMBER OF VIEWS: One view.

TECHNIQUE: Single frontal radiographic view of the chest acquired.

RADIATION DOSE: NA

LIMITATIONS: None.



FINDINGS:  LUNGS AND PLEURA: Increasing airspace disease in the right lower lobe and probably retroca
rdiac left lower lobe.  No large pleural effusion.

MEDIASTINUM AND HILAR STRUCTURES: No masses.  Contour normal.

HEART AND VASCULAR STRUCTURES: Heart normal in size.  Normal vasculature.

BONES: No acute findings.

HARDWARE: None in the chest.

OTHER: No other significant finding.



IMPRESSION:  INCREASING AIRSPACE DISEASE IN THE LUNG BASES, CONCERNING FOR DEVELOPING PNEUMONIA.



TECHNICAL DOCUMENTATION:  JOB ID:  [id number]

 2011 [employer]- All Rights Reserved



Reading location - IP/workstation name: [facility]

## 2018-06-05 LAB
ADD MANUAL DIFF: NO
ANION GAP SERPL CALC-SCNC: 13 MMOL/L (ref 5–19)
BASOPHILS # BLD AUTO: 0 10^3/UL (ref 0–0.2)
BASOPHILS NFR BLD AUTO: 0.3 % (ref 0–2)
BUN SERPL-MCNC: 15 MG/DL (ref 7–20)
CALCIUM: 8.9 MG/DL (ref 8.4–10.2)
CHLORIDE SERPL-SCNC: 105 MMOL/L (ref 98–107)
CO2 SERPL-SCNC: 27 MMOL/L (ref 22–30)
EOSINOPHIL # BLD AUTO: 0 10^3/UL (ref 0–0.6)
EOSINOPHIL NFR BLD AUTO: 0.1 % (ref 0–6)
ERYTHROCYTE [DISTWIDTH] IN BLOOD BY AUTOMATED COUNT: 12.8 % (ref 11.5–14)
GLUCOSE SERPL-MCNC: 91 MG/DL (ref 75–110)
HCT VFR BLD CALC: 31.9 % (ref 36–47)
HGB BLD-MCNC: 11.1 G/DL (ref 12–15.5)
LYMPHOCYTES # BLD AUTO: 1.1 10^3/UL (ref 0.5–4.7)
LYMPHOCYTES NFR BLD AUTO: 10.7 % (ref 13–45)
MCH RBC QN AUTO: 30.1 PG (ref 27–33.4)
MCHC RBC AUTO-ENTMCNC: 34.7 G/DL (ref 32–36)
MCV RBC AUTO: 87 FL (ref 80–97)
MONOCYTES # BLD AUTO: 1.1 10^3/UL (ref 0.1–1.4)
MONOCYTES NFR BLD AUTO: 11 % (ref 3–13)
NEUTROPHILS # BLD AUTO: 8.1 10^3/UL (ref 1.7–8.2)
NEUTS SEG NFR BLD AUTO: 77.9 % (ref 42–78)
PHOSPHATE SERPL-MCNC: 3.8 MG/DL (ref 2.5–4.5)
PLATELET # BLD: 246 10^3/UL (ref 150–450)
POTASSIUM SERPL-SCNC: 3.1 MMOL/L (ref 3.6–5)
RBC # BLD AUTO: 3.67 10^6/UL (ref 3.72–5.28)
SODIUM SERPL-SCNC: 145 MMOL/L (ref 137–145)
TOTAL CELLS COUNTED % (AUTO): 100 %
WBC # BLD AUTO: 10.3 10^3/UL (ref 4–10.5)

## 2018-06-05 RX ADMIN — MULTIVITAMIN TABLET SCH TAB: TABLET at 11:38

## 2018-06-05 RX ADMIN — ENOXAPARIN SODIUM SCH MG: 40 INJECTION SUBCUTANEOUS at 09:34

## 2018-06-05 RX ADMIN — ACETAMINOPHEN PRN MG: 650 SUPPOSITORY RECTAL at 23:04

## 2018-06-05 RX ADMIN — SENNOSIDES, DOCUSATE SODIUM SCH EACH: 50; 8.6 TABLET, FILM COATED ORAL at 21:05

## 2018-06-05 RX ADMIN — ACETAMINOPHEN PRN MG: 650 SUPPOSITORY RECTAL at 08:03

## 2018-06-05 RX ADMIN — ACETAMINOPHEN PRN MG: 650 SUPPOSITORY RECTAL at 13:31

## 2018-06-05 RX ADMIN — POTASSIUM CHLORIDE SCH ML: 29.8 INJECTION, SOLUTION INTRAVENOUS at 15:46

## 2018-06-05 RX ADMIN — LACTULOSE SCH GM: 20 SOLUTION ORAL at 17:23

## 2018-06-05 RX ADMIN — MEROPENEM SCH GM: 1 INJECTION INTRAVENOUS at 01:50

## 2018-06-05 RX ADMIN — VANCOMYCIN HYDROCHLORIDE SCH MG: 1 INJECTION, POWDER, LYOPHILIZED, FOR SOLUTION INTRAVENOUS at 09:44

## 2018-06-05 RX ADMIN — VANCOMYCIN HYDROCHLORIDE SCH MG: 1 INJECTION, POWDER, LYOPHILIZED, FOR SOLUTION INTRAVENOUS at 21:07

## 2018-06-05 RX ADMIN — PROBIOTIC PRODUCT - TAB SCH MG: TAB at 17:23

## 2018-06-05 RX ADMIN — NYSTATIN SCH APPLIC: 100000 POWDER TOPICAL at 17:31

## 2018-06-05 RX ADMIN — POTASSIUM CHLORIDE SCH ML: 29.8 INJECTION, SOLUTION INTRAVENOUS at 11:38

## 2018-06-05 RX ADMIN — MEROPENEM SCH GM: 1 INJECTION INTRAVENOUS at 17:30

## 2018-06-05 RX ADMIN — DIVALPROEX SODIUM SCH MG: 250 TABLET, FILM COATED, EXTENDED RELEASE ORAL at 21:05

## 2018-06-05 RX ADMIN — PROBIOTIC PRODUCT - TAB SCH MG: TAB at 11:38

## 2018-06-05 RX ADMIN — LANSOPRAZOLE SCH MG: 30 TABLET, ORALLY DISINTEGRATING, DELAYED RELEASE ORAL at 05:42

## 2018-06-05 RX ADMIN — MEROPENEM SCH GM: 1 INJECTION INTRAVENOUS at 08:21

## 2018-06-05 RX ADMIN — NYSTATIN SCH APPLIC: 100000 POWDER TOPICAL at 11:39

## 2018-06-05 RX ADMIN — POTASSIUM CHLORIDE SCH ML: 29.8 INJECTION, SOLUTION INTRAVENOUS at 13:22

## 2018-06-05 NOTE — PDOC PROGRESS REPORT
Subjective


Progress Note for:: 06/05/18


Subjective:: 





73 yr old female with severe dementia, resident of [facility].


Presented with fever and UTI.


Awake and alert, smiles at me but does not speak or follow commands.





She developed an acute episode of dyspnea and hypoxia on 6/4/18 secondary to 

aspiration. Is doing better now.


Is npo, pending video barium swallow





Continues to have fevers.


Blood and Urine cultures pending.


Day 2 of Vancomycin and Meropenem








Reason For Visit: 


URINARY TRACT INFECTION








Physical Exam


Vital Signs: 


 











Temp Pulse Resp BP Pulse Ox


 


 102.2 F H  116 H  24 H  147/80 H  100 


 


 06/05/18 12:03  06/05/18 12:03  06/05/18 12:03  06/05/18 12:03  06/05/18 12:03








 Intake & Output











 06/04/18 06/05/18 06/06/18





 06:59 06:59 06:59


 


Intake Total 800 600 0


 


Output Total  3250 175


 


Balance 800 -2650 -175


 


Weight 68.9 kg 63.8 kg 











General appearance: PRESENT: no acute distress


Ear exam: PRESENT: normal external ear exam


Respiratory exam: PRESENT: rhonchi, symmetrical, unlabored


Cardiovascular exam: PRESENT: RRR


GI/Abdominal exam: PRESENT: normal bowel sounds, soft.  ABSENT: tenderness


Rectal exam: PRESENT: deferred


Gentrourinary exam: PRESENT: indwelling catheter


Extremities exam: ABSENT: pedal edema


Neurological exam: PRESENT: alert, awake





Results


Laboratory Results: 


 





 06/05/18 04:47 





 06/05/18 04:47 





 











  06/05/18 06/05/18





  04:47 04:47


 


WBC  10.3 


 


RBC  3.67 L 


 


Hgb  11.1 L 


 


Hct  31.9 L 


 


MCV  87 


 


MCH  30.1 


 


MCHC  34.7 


 


RDW  12.8 


 


Plt Count  246 


 


Seg Neutrophils %  77.9 


 


Lymphocytes %  10.7 L 


 


Monocytes %  11.0 


 


Eosinophils %  0.1 


 


Basophils %  0.3 


 


Absolute Neutrophils  8.1 


 


Absolute Lymphocytes  1.1 


 


Absolute Monocytes  1.1 


 


Absolute Eosinophils  0.0 


 


Absolute Basophils  0.0 


 


Sodium   145.0


 


Potassium   3.1 L


 


Chloride   105


 


Carbon Dioxide   27


 


Anion Gap   13


 


BUN   15


 


Creatinine   0.48 L


 


Est GFR ( Amer)   > 60


 


Est GFR (Non-Af Amer)   > 60


 


Glucose   91


 


Calcium   8.9


 


Phosphorus   3.8


 


Magnesium   1.7








 











  06/04/18





  05:19


 


NT-Pro-B Natriuret Pep  6340 H











Impressions: 


 





Chest X-Ray  06/04/18 00:00


IMPRESSION:  INCREASING AIRSPACE DISEASE IN THE LUNG BASES, CONCERNING FOR 

DEVELOPING PNEUMONIA.


 














Assessment & Plan





- Diagnosis


(1) Urinary tract infection


Qualifiers: 


   Urinary tract infection type: site unspecified   Hematuria presence: with 

hematuria   Qualified Code(s): N39.0 - Urinary tract infection, site not 

specified; R31.9 - Hematuria, unspecified; R31.9 - Hematuria, unspecified   


Is this a current diagnosis for this admission?: Yes   


Plan: 


Blood and Urine cultures pending.


Day 2 of Vancomycin and Meropenem








(2) Diaper rash


Is this a current diagnosis for this admission?: Yes   


Plan: 


Local care and barrier cream








(3) Dementia


Qualifiers: 


   Dementia type: unspecified type   Dementia behavioral disturbance: without 

behavioral disturbance   Qualified Code(s): F03.90 - Unspecified dementia 

without behavioral disturbance   


Is this a current diagnosis for this admission?: Yes   


Plan: 


Stable- continue outpatient meds.


Pureed diet








(4) Hypothyroidism


Qualifiers: 


   Hypothyroidism type: acquired   Qualified Code(s): E03.9 - Hypothyroidism, 

unspecified   


Is this a current diagnosis for this admission?: Yes   


Plan: 


Synthroid








(5) DVT prophylaxis


Is this a current diagnosis for this admission?: Yes   


Plan: 


S/C Lovenox








(6) Full code status


Is this a current diagnosis for this admission?: Yes   





- Time


Time Spent with patient: 35 or more minutes

## 2018-06-06 LAB
ANION GAP SERPL CALC-SCNC: 8 MMOL/L (ref 5–19)
BUN SERPL-MCNC: 16 MG/DL (ref 7–20)
CALCIUM: 8.6 MG/DL (ref 8.4–10.2)
CHLORIDE SERPL-SCNC: 104 MMOL/L (ref 98–107)
CO2 SERPL-SCNC: 30 MMOL/L (ref 22–30)
GLUCOSE SERPL-MCNC: 104 MG/DL (ref 75–110)
PHOSPHATE SERPL-MCNC: 3.5 MG/DL (ref 2.5–4.5)
POTASSIUM SERPL-SCNC: 3.3 MMOL/L (ref 3.6–5)
SODIUM SERPL-SCNC: 142.2 MMOL/L (ref 137–145)
VANCOMYCIN,TROUGH: 9.5 UG/ML (ref 5–20)

## 2018-06-06 RX ADMIN — NYSTATIN SCH APPLIC: 100000 POWDER TOPICAL at 11:34

## 2018-06-06 RX ADMIN — ACETAMINOPHEN PRN MG: 650 SUPPOSITORY RECTAL at 03:15

## 2018-06-06 RX ADMIN — MEROPENEM SCH GM: 1 INJECTION INTRAVENOUS at 18:40

## 2018-06-06 RX ADMIN — MULTIVITAMIN TABLET SCH TAB: TABLET at 11:08

## 2018-06-06 RX ADMIN — ATORVASTATIN CALCIUM SCH MG: 10 TABLET, FILM COATED ORAL at 22:11

## 2018-06-06 RX ADMIN — ENOXAPARIN SODIUM SCH MG: 40 INJECTION SUBCUTANEOUS at 11:33

## 2018-06-06 RX ADMIN — PROBIOTIC PRODUCT - TAB SCH MG: TAB at 11:08

## 2018-06-06 RX ADMIN — NYSTATIN SCH APPLIC: 100000 POWDER TOPICAL at 18:40

## 2018-06-06 RX ADMIN — LANSOPRAZOLE SCH MG: 30 TABLET, ORALLY DISINTEGRATING, DELAYED RELEASE ORAL at 05:06

## 2018-06-06 RX ADMIN — MEROPENEM SCH GM: 1 INJECTION INTRAVENOUS at 11:33

## 2018-06-06 RX ADMIN — MEROPENEM SCH GM: 1 INJECTION INTRAVENOUS at 01:18

## 2018-06-06 RX ADMIN — SENNOSIDES, DOCUSATE SODIUM SCH EACH: 50; 8.6 TABLET, FILM COATED ORAL at 22:10

## 2018-06-06 RX ADMIN — DIVALPROEX SODIUM SCH MG: 250 TABLET, FILM COATED, EXTENDED RELEASE ORAL at 22:11

## 2018-06-06 RX ADMIN — RIVASTIGMINE TARTRATE SCH MG: 1.5 CAPSULE ORAL at 17:16

## 2018-06-06 RX ADMIN — PROBIOTIC PRODUCT - TAB SCH MG: TAB at 17:16

## 2018-06-06 RX ADMIN — DEXTROSE, SODIUM CHLORIDE, AND POTASSIUM CHLORIDE PRN ML: 5; .45; .3 INJECTION INTRAVENOUS at 15:45

## 2018-06-06 RX ADMIN — LACTULOSE SCH GM: 20 SOLUTION ORAL at 17:17

## 2018-06-06 NOTE — PDOC PROGRESS REPORT
Subjective


Progress Note for:: 06/06/18


Subjective:: 





73 yr old female with severe dementia, resident of [facility].


Presented with fever and UTI.


Awake and alert, smiles at me but does not speak or follow commands.





She developed an acute episode of dyspnea and hypoxia on 6/4/18 secondary to 

aspiration. Is doing better now.


Is npo, pending video barium swallow.





Urine cultures grew ESBL producing E. coli.


Blood cultures pending.


Day 3 of Vancomycin and Meropenem.


Mycin stopped.








Reason For Visit: 


URINARY TRACT INFECTION








Physical Exam


Vital Signs: 


 











Temp Pulse Resp BP Pulse Ox


 


 98.2 F   84   20   119/65   96 


 


 06/06/18 12:00  06/06/18 12:00  06/06/18 12:00  06/06/18 12:00  06/06/18 12:00








 Intake & Output











 06/05/18 06/06/18 06/07/18





 06:59 06:59 06:59


 


Intake Total 600 1052 


 


Output Total 3250 700 200


 


Balance -2650 352 -200


 


Weight 63.8 kg 69.6 kg 











General appearance: PRESENT: no acute distress


Head exam: PRESENT: normocephalic


Ear exam: PRESENT: normal external ear exam


Neck exam: ABSENT: tracheal deviation


Respiratory exam: PRESENT: symmetrical, unlabored


Cardiovascular exam: PRESENT: RRR, systolic murmur


GI/Abdominal exam: PRESENT: normal bowel sounds, soft.  ABSENT: tenderness


Rectal exam: PRESENT: deferred


Gentrourinary exam: PRESENT: indwelling catheter


Extremities exam: ABSENT: pedal edema





Results


Laboratory Results: 


 





 06/05/18 04:47 





 06/06/18 08:36 





 











  06/06/18





  08:36


 


Sodium  142.2


 


Potassium  3.3 L


 


Chloride  104


 


Carbon Dioxide  30


 


Anion Gap  8


 


BUN  16


 


Creatinine  0.47 L


 


Est GFR ( Amer)  > 60


 


Est GFR (Non-Af Amer)  > 60


 


Glucose  104


 


Calcium  8.6


 


Phosphorus  3.5


 


Magnesium  2.4 H








 











  06/04/18 06/06/18





  05:19 08:36


 


NT-Pro-B Natriuret Pep  6340 H  46362 H











Impressions: 


 





Chest X-Ray  06/04/18 00:00


IMPRESSION:  INCREASING AIRSPACE DISEASE IN THE LUNG BASES, CONCERNING FOR 

DEVELOPING PNEUMONIA.


 








Modified Barium Swallow  06/06/18 00:00


IMPRESSION:  LARYNGEAL PENETRATION WITH THIN LIQUIDS.  NO ASPIRATION 

SEEN.PLEASE SEE SPEECH PATHOLOGIST REPORT FOR OTHER FINDINGS AND 

RECOMMENDATIONS.


 














Assessment & Plan





- Diagnosis


(1) Urinary tract infection


Qualifiers: 


   Urinary tract infection type: site unspecified   Hematuria presence: with 

hematuria   Qualified Code(s): N39.0 - Urinary tract infection, site not 

specified; R31.9 - Hematuria, unspecified; R31.9 - Hematuria, unspecified   


Is this a current diagnosis for this admission?: Yes   


Plan: 





Day 3 of Meropenem








(2) Diaper rash


Is this a current diagnosis for this admission?: Yes   


Plan: 


Local care and barrier cream








(3) Dementia


Qualifiers: 


   Dementia type: unspecified type   Dementia behavioral disturbance: without 

behavioral disturbance   Qualified Code(s): F03.90 - Unspecified dementia 

without behavioral disturbance   


Is this a current diagnosis for this admission?: Yes   


Plan: 





N.p.o. for now.  Start diet per speech recommendations.








(4) Hypothyroidism


Qualifiers: 


   Hypothyroidism type: acquired   Qualified Code(s): E03.9 - Hypothyroidism, 

unspecified   


Is this a current diagnosis for this admission?: Yes   


Plan: 


Synthroid








(5) DVT prophylaxis


Is this a current diagnosis for this admission?: Yes   


Plan: 


S/C Lovenox








(6) Full code status


Is this a current diagnosis for this admission?: Yes   





(7) Aspiration pneumonia


Qualifiers: 


   Aspiration pneumonia type: unspecified   Laterality: right   Lung location: 

lower lobe of lung   Qualified Code(s): J69.0 - Pneumonitis due to inhalation 

of food and vomit   


Is this a current diagnosis for this admission?: Yes   


Plan: 


Day 3 of antibiotics.








- Time


Time Spent with patient: 25-34 minutes

## 2018-06-06 NOTE — RADIOLOGY REPORT (SQ)
EXAM DESCRIPTION:  CHEST SINGLE VIEW



COMPLETED DATE/TIME:  6/6/2018 2:19 pm



REASON FOR STUDY:  Dyspnea, aspiration



COMPARISON:  6/4/2018.



EXAM PARAMETERS:  NUMBER OF VIEWS: One view.

TECHNIQUE: Single frontal radiographic view of the chest acquired.

RADIATION DOSE: NA

LIMITATIONS: None.



FINDINGS:  LUNGS AND PLEURA: Faint basilar densities with improved aeration.  No pleural effusion or 
pneumothorax.

MEDIASTINUM AND HILAR STRUCTURES: No masses.  Contour normal.

HEART AND VASCULAR STRUCTURES: Heart upper limits of normal in size.  Normal vasculature.

BONES: No acute findings.

HARDWARE: None in the chest.

OTHER: No other significant finding.



IMPRESSION:  IMPROVED AERATION IN THE LUNG BASES.



TECHNICAL DOCUMENTATION:  JOB ID:  [id number]

 2011 [employer]- All Rights Reserved



Reading location - IP/workstation name: [email or web address]

## 2018-06-06 NOTE — RADIOLOGY REPORT (SQ)
EXAM DESCRIPTION:  COOKIE SWALLOW



COMPLETED DATE/TIME:  6/6/2018 9:29 am



REASON FOR STUDY:  possible aspiration DEMENTIA



COMPARISON:  Modified barium swallow 5/3/2018



TECHNIQUE:  Videofluoroscopic swallowing examination was performed in conjunction with speech patholo
gy.  Videofluoroscopic imaging was obtained and reviewed and these are the findings:



RADIATION DOSE:  1 minutes 42 seconds of fluoroscopy was used.

1 images saved to PACS.



LIMITATIONS:  None



FINDINGS:  The patient was brought into the fluoro room and placed upright on a modified barium swall
ow chair.  The patient was then given multiple consistencies mixed with barium to swallow under live 
fluoroscopic video guidance.  According to the Speech Pathologist there was laryngeal penetration wit
hout aspiration with thin liquids.



IMPRESSION:  LARYNGEAL PENETRATION WITH THIN LIQUIDS.  NO ASPIRATION SEEN.PLEASE SEE SPEECH PATHOLOGI
ST REPORT FOR OTHER FINDINGS AND RECOMMENDATIONS.



COMMENT:  Quality :  Final reports for procedures using fluoroscopy that document radiation exp
osure indices, or exposure time and number of fluorographic images (if radiation exposure indices are
 not available)



TECHNICAL DOCUMENTATION:  JOB ID: [id number]

 2011 [employer]- All Rights Reserved



Reading location - IP/workstation name: [facility]

## 2018-06-06 NOTE — ST INP MODIFIED BARIUM SWALLOW
Medical Diagnosis





- Medical Diagnoses


Medical Diagnosis Description & ICD-10 Code(s):  aspiration pneumonia J69.0, 

dysphagia R13.10





 [place] [facility] [place]





- General


Date: 06/06/18


Date of Onset: 06/04/18





- History


History Obtained From: Other - EMR


-: Medical - Patient admitted with fever and UTI, subsequently had episode of 

suspected aspiration.


Medications: Medications Reviewed


Allergies: No known allergies





- Subjective


Current Nutritional Means: NPO


Current PO Diet: N/A (NPO)


Current Symptoms: Aspiration


Pain: unable to communicate, no signs/symptoms of pain





- Objective


Assessment: Upright, Left Lateral





- Food Trials


Food Trials Used: Thin liquids, Pureed


The Patient: Required Assist, fed by ST, via spoon, via straw





- Assessment


Labial Function: Within Normal Limits


Laryngeal Function: no volitional swallow, no volitional cough/clear





- Pharyngeal Stage


Initiation of Pharyngeal Stage: Delayed


Reflex Delay Time (seconds): 3


Decreased Laryngeal Elevation: No


Reduced Pressure Generation: No


Reduced Tongue Base Retraction: No


Pre-Swallowing Pooling in Valleculae: Mild


Pre-Swallowing Pooling in Pyriforms: Mild


Reduced Thyro-Hyiod Approximation: No


Reduced Epiglottic Excursion: No


Reduced Pharyngeal Peristalsis: No


Multiple Swallows With: Cleared w/ Dry Swallow


Post Swallow Residuals in Valleculae: None


Post Swallow Residuals in Pyriforms: None





- Impression/Summary


Laryngeal Penetration: Yes, Deep, Silent, after swallow


Tracheal Aspiration: after swallow - signs of trace aspiration with cup sip of 

thin liquid


Productive Cough: No


Compensatory Strategies: improved swallowing with single straw sips for liquids


Patient Presents With: Pharyngeal stage dysph., Mild-Moderate


Risk of Aspiration: Mild


Risk of Nutritional Compromise: Mild





- Recommendations


Solid Diet Recommendations: Pureed


Liquid Diet Recommendations: Thin


Strict Aspitarion Precautions: Yes


Dysphagia Therapy with SLP: No


Recommended Techniques: Fully Upright During Meal, Med Crushed in Applesauce


Supervision: requires assistance





- Time


Total Time: 20


Total Timed Minutes: 20

## 2018-06-07 LAB
ADD MANUAL DIFF: NO
ANION GAP SERPL CALC-SCNC: 8 MMOL/L (ref 5–19)
BASOPHILS # BLD AUTO: 0 10^3/UL (ref 0–0.2)
BASOPHILS NFR BLD AUTO: 0.3 % (ref 0–2)
BUN SERPL-MCNC: 21 MG/DL (ref 7–20)
CALCIUM: 8.4 MG/DL (ref 8.4–10.2)
CHLORIDE SERPL-SCNC: 110 MMOL/L (ref 98–107)
CO2 SERPL-SCNC: 28 MMOL/L (ref 22–30)
EOSINOPHIL # BLD AUTO: 0.1 10^3/UL (ref 0–0.6)
EOSINOPHIL NFR BLD AUTO: 1.6 % (ref 0–6)
ERYTHROCYTE [DISTWIDTH] IN BLOOD BY AUTOMATED COUNT: 13 % (ref 11.5–14)
GLUCOSE SERPL-MCNC: 111 MG/DL (ref 75–110)
HCT VFR BLD CALC: 27.9 % (ref 36–47)
HGB BLD-MCNC: 9.7 G/DL (ref 12–15.5)
LYMPHOCYTES # BLD AUTO: 1 10^3/UL (ref 0.5–4.7)
LYMPHOCYTES NFR BLD AUTO: 17.3 % (ref 13–45)
MCH RBC QN AUTO: 30.6 PG (ref 27–33.4)
MCHC RBC AUTO-ENTMCNC: 34.8 G/DL (ref 32–36)
MCV RBC AUTO: 88 FL (ref 80–97)
MONOCYTES # BLD AUTO: 0.5 10^3/UL (ref 0.1–1.4)
MONOCYTES NFR BLD AUTO: 8.6 % (ref 3–13)
NEUTROPHILS # BLD AUTO: 4 10^3/UL (ref 1.7–8.2)
NEUTS SEG NFR BLD AUTO: 72.2 % (ref 42–78)
PHOSPHATE SERPL-MCNC: 2.9 MG/DL (ref 2.5–4.5)
PLATELET # BLD: 231 10^3/UL (ref 150–450)
POTASSIUM SERPL-SCNC: 3.5 MMOL/L (ref 3.6–5)
RBC # BLD AUTO: 3.18 10^6/UL (ref 3.72–5.28)
SODIUM SERPL-SCNC: 145.5 MMOL/L (ref 137–145)
TOTAL CELLS COUNTED % (AUTO): 100 %
WBC # BLD AUTO: 5.6 10^3/UL (ref 4–10.5)

## 2018-06-07 RX ADMIN — DEXTROSE, SODIUM CHLORIDE, AND POTASSIUM CHLORIDE PRN ML: 5; .45; .3 INJECTION INTRAVENOUS at 10:09

## 2018-06-07 RX ADMIN — DIVALPROEX SODIUM SCH MG: 250 TABLET, FILM COATED, EXTENDED RELEASE ORAL at 23:42

## 2018-06-07 RX ADMIN — MEROPENEM SCH GM: 1 INJECTION INTRAVENOUS at 10:52

## 2018-06-07 RX ADMIN — ATORVASTATIN CALCIUM SCH MG: 10 TABLET, FILM COATED ORAL at 23:41

## 2018-06-07 RX ADMIN — SENNOSIDES, DOCUSATE SODIUM SCH EACH: 50; 8.6 TABLET, FILM COATED ORAL at 23:40

## 2018-06-07 RX ADMIN — ASPIRIN 325 MG ORAL TABLET SCH MG: 325 PILL ORAL at 10:10

## 2018-06-07 RX ADMIN — SULFAMETHOXAZOLE AND TRIMETHOPRIM SCH TAB: 800; 160 TABLET ORAL at 23:41

## 2018-06-07 RX ADMIN — ENOXAPARIN SODIUM SCH MG: 40 INJECTION SUBCUTANEOUS at 10:09

## 2018-06-07 RX ADMIN — NYSTATIN SCH APPLIC: 100000 POWDER TOPICAL at 10:52

## 2018-06-07 RX ADMIN — MULTIVITAMIN TABLET SCH TAB: TABLET at 10:10

## 2018-06-07 RX ADMIN — RIVASTIGMINE TARTRATE SCH MG: 1.5 CAPSULE ORAL at 10:10

## 2018-06-07 RX ADMIN — MEROPENEM SCH GM: 1 INJECTION INTRAVENOUS at 02:45

## 2018-06-07 RX ADMIN — PROBIOTIC PRODUCT - TAB SCH MG: TAB at 10:10

## 2018-06-07 RX ADMIN — RIVASTIGMINE TARTRATE SCH MG: 1.5 CAPSULE ORAL at 17:46

## 2018-06-07 RX ADMIN — PROBIOTIC PRODUCT - TAB SCH MG: TAB at 17:45

## 2018-06-07 RX ADMIN — MEROPENEM SCH GM: 1 INJECTION INTRAVENOUS at 17:45

## 2018-06-07 RX ADMIN — DEXTROSE, SODIUM CHLORIDE, AND POTASSIUM CHLORIDE PRN ML: 5; .45; .3 INJECTION INTRAVENOUS at 23:42

## 2018-06-07 RX ADMIN — NYSTATIN SCH APPLIC: 100000 POWDER TOPICAL at 17:45

## 2018-06-07 RX ADMIN — LEVOTHYROXINE SODIUM SCH MG: 100 TABLET ORAL at 05:34

## 2018-06-07 RX ADMIN — LANSOPRAZOLE SCH MG: 30 TABLET, ORALLY DISINTEGRATING, DELAYED RELEASE ORAL at 05:34

## 2018-06-07 RX ADMIN — LACTULOSE SCH GM: 20 SOLUTION ORAL at 17:51

## 2018-06-07 NOTE — PROGRESS NOTE
Provider Note


Provider Note: 





ID Consult Note-





I was asked to review the patient's chart by the Pharmacy Department.  





The patient has a history of dementia.  She was admitted with fever, an 

abnormal urinalysis with pyuria, and urine culture positive for ESBL E. coli.  

The organism is sensitive to TMP-SMX and carbapenems.  She is currently being 

treated with meropenem.  Patient is no longer febrile; WBC is now normal.  





I reviewed the patient's CXRs.  I do not believe that the patient has 

pneumonia.  The infiltrates in the RLL are improved and appear to represent 

atelectasis rather than pneumonia.  





Recommend discontinuing meropenem and treating the E. coli in the urine with TMP

-SMX (Bactrim) DS- 1 tab po BID.  She is taking oral medications, so I do not 

see any reason why she cannot continue therapy with oral antibiotics.  





Please contact me if there are questions. 





Bart Beltre MD


Pager: [phone]

## 2018-06-07 NOTE — PDOC PROGRESS REPORT
Subjective


Progress Note for:: 06/07/18


Subjective:: 





Patient is seen resting in bed.  She is awake and alert.  She does not answer 

any questions due to her dementia.  Nursing report no issues overnight.  She 

has been eating and drinking well.  Social work has been attempting to get a 

hold of her son without success so far.  Review of systems cannot be obtained 

due to patient's mentation.


Reason For Visit: 


URINARY TRACT INFECTION








Physical Exam


Vital Signs: 


 











Temp Pulse Resp BP Pulse Ox


 


 98.3 F   98   16   104/71   98 


 


 06/07/18 11:14  06/07/18 11:14  06/07/18 11:14  06/07/18 11:14  06/07/18 11:14








 Intake & Output











 06/06/18 06/07/18 06/08/18





 06:59 06:59 06:59


 


Intake Total 1052 1100 


 


Output Total 700 800 


 


Balance 352 300 


 


Weight 69.6 kg 69.5 kg 











General appearance: PRESENT: no acute distress, well-developed, well-nourished


Head exam: PRESENT: atraumatic, normocephalic


Eye exam: PRESENT: conjunctiva pink, EOMI, PERRLA.  ABSENT: scleral icterus


Ear exam: PRESENT: normal external ear exam


Mouth exam: PRESENT: moist, neck supple, tongue midline


Teeth exam: PRESENT: edentulous


Neck exam: ABSENT: carotid bruit, JVD, lymphadenopathy, thyromegaly


Respiratory exam: PRESENT: clear to auscultation jose.  ABSENT: rales, rhonchi, 

wheezes


Cardiovascular exam: PRESENT: RRR.  ABSENT: diastolic murmur, rubs, systolic 

murmur


Pulses: PRESENT: normal dorsalis pedis pul


Vascular exam: PRESENT: normal capillary refill


GI/Abdominal exam: PRESENT: normal bowel sounds, soft.  ABSENT: distended, 

guarding, mass, organolmegaly, rebound, tenderness


Rectal exam: PRESENT: deferred


Extremities exam: PRESENT: full ROM.  ABSENT: calf tenderness, clubbing, pedal 

edema


Musculoskeletal exam: PRESENT: full ROM, normal inspection


Neurological exam: PRESENT: alert, altered, awake, reflexes normal, CN II-XII 

grossly intact


Psychiatric exam: PRESENT: flat affect


Skin exam: PRESENT: dry, intact, warm.  ABSENT: cyanosis, rash





Results


Laboratory Results: 


 





 06/07/18 06:20 





 06/07/18 06:20 





 











  06/07/18 06/07/18





  06:20 06:20


 


WBC  5.6 


 


RBC  3.18 L 


 


Hgb  9.7 L 


 


Hct  27.9 L 


 


MCV  88 


 


MCH  30.6 


 


MCHC  34.8 


 


RDW  13.0 


 


Plt Count  231 


 


Seg Neutrophils %  72.2 


 


Lymphocytes %  17.3 


 


Monocytes %  8.6 


 


Eosinophils %  1.6 


 


Basophils %  0.3 


 


Absolute Neutrophils  4.0 


 


Absolute Lymphocytes  1.0 


 


Absolute Monocytes  0.5 


 


Absolute Eosinophils  0.1 


 


Absolute Basophils  0.0 


 


Sodium   145.5 H


 


Potassium   3.5 L


 


Chloride   110 H


 


Carbon Dioxide   28


 


Anion Gap   8


 


BUN   21 H


 


Creatinine   0.46 L


 


Est GFR ( Amer)   > 60


 


Est GFR (Non-Af Amer)   > 60


 


Glucose   111 H


 


Calcium   8.4


 


Phosphorus   2.9


 


Magnesium   2.2








 











  06/04/18 06/06/18





  05:19 08:36


 


NT-Pro-B Natriuret Pep  6340 H  50309 H











Impressions: 


 





Chest X-Ray  06/06/18 00:00


IMPRESSION:  IMPROVED AERATION IN THE LUNG BASES.


 








Modified Barium Swallow  06/06/18 00:00


IMPRESSION:  LARYNGEAL PENETRATION WITH THIN LIQUIDS.  NO ASPIRATION 

SEEN.PLEASE SEE SPEECH PATHOLOGIST REPORT FOR OTHER FINDINGS AND 

RECOMMENDATIONS.


 














Assessment & Plan





- Diagnosis


(1) UTI due to extended-spectrum beta lactamase (ESBL) producing Escherichia 

coli


Is this a current diagnosis for this admission?: Yes   





(2) Diaper rash


Is this a current diagnosis for this admission?: Yes   


Plan: 


Today is her day #4 of therapy.  She is on meropenem








(3) Aspiration pneumonia


Qualifiers: 


   Aspiration pneumonia type: due to vomit   Laterality: right   Lung location: 

lower lobe of lung   Qualified Code(s): J69.0 - Pneumonitis due to inhalation 

of food and vomit   


Is this a current diagnosis for this admission?: Yes   


Plan: 


Today is day #4 meropenem








(4) Hyperlipidemia


Qualifiers: 


   Hyperlipidemia type: unspecified   Qualified Code(s): E78.5 - Hyperlipidemia

, unspecified   


Is this a current diagnosis for this admission?: Yes   


Plan: 


Continue statin








(5) Hypokalemia


Is this a current diagnosis for this admission?: Yes   


Plan: 


Replete and monitor








(6) Hypothyroidism


Qualifiers: 


   Hypothyroidism type: acquired   Qualified Code(s): E03.9 - Hypothyroidism, 

unspecified   


Is this a current diagnosis for this admission?: Yes   


Plan: 


Continue Levoxyl








(7) Dementia


Qualifiers: 


   Dementia type: unspecified type   Dementia behavioral disturbance: without 

behavioral disturbance   Qualified Code(s): F03.90 - Unspecified dementia 

without behavioral disturbance   


Is this a current diagnosis for this admission?: Yes   


Plan: 


She is unable make decisions for herself.  Social work is not been able to get 

a hold of her son.  She normally resides at the [facility] 








- Time


Time Spent with patient: 25-34 minutes


Total Critical Time (Minutes): 15


Medications reviewed and adjusted accordingly: Yes


Anticipated discharge: SNF

## 2018-06-08 RX ADMIN — RIVASTIGMINE TARTRATE SCH MG: 1.5 CAPSULE ORAL at 17:11

## 2018-06-08 RX ADMIN — LACTULOSE SCH GM: 20 SOLUTION ORAL at 17:05

## 2018-06-08 RX ADMIN — DEXTROSE, SODIUM CHLORIDE, AND POTASSIUM CHLORIDE PRN ML: 5; .45; .3 INJECTION INTRAVENOUS at 15:24

## 2018-06-08 RX ADMIN — MULTIVITAMIN TABLET SCH TAB: TABLET at 09:51

## 2018-06-08 RX ADMIN — DIVALPROEX SODIUM SCH MG: 250 TABLET, FILM COATED, EXTENDED RELEASE ORAL at 21:57

## 2018-06-08 RX ADMIN — NYSTATIN SCH APPLIC: 100000 POWDER TOPICAL at 17:09

## 2018-06-08 RX ADMIN — SENNOSIDES, DOCUSATE SODIUM SCH EACH: 50; 8.6 TABLET, FILM COATED ORAL at 21:58

## 2018-06-08 RX ADMIN — SULFAMETHOXAZOLE AND TRIMETHOPRIM SCH TAB: 800; 160 TABLET ORAL at 09:51

## 2018-06-08 RX ADMIN — ATORVASTATIN CALCIUM SCH MG: 10 TABLET, FILM COATED ORAL at 21:57

## 2018-06-08 RX ADMIN — ASPIRIN 325 MG ORAL TABLET SCH MG: 325 PILL ORAL at 09:51

## 2018-06-08 RX ADMIN — RIVASTIGMINE TARTRATE SCH MG: 1.5 CAPSULE ORAL at 09:50

## 2018-06-08 RX ADMIN — SULFAMETHOXAZOLE AND TRIMETHOPRIM SCH TAB: 800; 160 TABLET ORAL at 21:57

## 2018-06-08 RX ADMIN — LANSOPRAZOLE SCH MG: 30 TABLET, ORALLY DISINTEGRATING, DELAYED RELEASE ORAL at 05:56

## 2018-06-08 RX ADMIN — NYSTATIN SCH APPLIC: 100000 POWDER TOPICAL at 09:50

## 2018-06-08 RX ADMIN — LEVOTHYROXINE SODIUM SCH MG: 100 TABLET ORAL at 05:56

## 2018-06-08 RX ADMIN — ENOXAPARIN SODIUM SCH MG: 40 INJECTION SUBCUTANEOUS at 09:50

## 2018-06-08 RX ADMIN — PROBIOTIC PRODUCT - TAB SCH MG: TAB at 17:11

## 2018-06-08 RX ADMIN — PROBIOTIC PRODUCT - TAB SCH MG: TAB at 09:51

## 2018-06-08 NOTE — PDOC PROGRESS REPORT
Subjective


Progress Note for:: 06/08/18


Subjective:: 





73 yr old female with severe dementia, resident of [facility].


Presented with fever and UTI.





She developed an acute episode of dyspnea and hypoxia on 6/4/18 thought 

secondary to aspiration. Is doing better now.


Is npo, pending video barium swallow.





Urine cultures grew ESBL producing E. coli.


Blood cultures negative x 5 days.





6/7/18 patient evaluated by Dr. Beltre of ID of [facility] and does not think she has 

pneumonia.  Recommended switching antibiotics to Bactrim DS twice daily p.o.  

Patient completed for 5 days of meropenem.





6/8/18--


Awake and alert, smiles at me but does not speak or follow commands.


Currently afebrile and appears comfortable.





Reason For Visit: 


URINARY TRACT INFECTION








Physical Exam


Vital Signs: 


 











Temp Pulse Resp BP Pulse Ox


 


 98.3 F   88   16   134/87 H  98 


 


 06/08/18 15:37  06/08/18 15:37  06/08/18 15:37  06/08/18 15:37  06/08/18 15:37








 Intake & Output











 06/07/18 06/08/18 06/09/18





 06:59 06:59 06:59


 


Intake Total 1100 2074 1500


 


Output Total 800 825 800


 


Balance 300 1249 700


 


Weight 69.5 kg 69.5 kg 











General appearance: PRESENT: no acute distress


Head exam: PRESENT: normocephalic


Ear exam: PRESENT: normal external ear exam


Neck exam: ABSENT: tracheal deviation


Respiratory exam: PRESENT: symmetrical, unlabored


Cardiovascular exam: PRESENT: RRR, systolic murmur


GI/Abdominal exam: PRESENT: normal bowel sounds, soft.  ABSENT: tenderness


Extremities exam: ABSENT: pedal edema








Results


Laboratory Results: 


 





 06/07/18 06:20 





 06/07/18 06:20 





 











  06/04/18 06/06/18





  05:19 08:36


 


NT-Pro-B Natriuret Pep  6340 H  77629 H











Impressions: 


 





Chest X-Ray  06/06/18 00:00


IMPRESSION:  IMPROVED AERATION IN THE LUNG BASES.


 








Modified Barium Swallow  06/06/18 00:00


IMPRESSION:  LARYNGEAL PENETRATION WITH THIN LIQUIDS.  NO ASPIRATION 

SEEN.PLEASE SEE SPEECH PATHOLOGIST REPORT FOR OTHER FINDINGS AND 

RECOMMENDATIONS.


 














Assessment & Plan





- Inpatient Certification


Based on my medical assessment, after consideration of the patient's 

comorbidities, presenting symptoms, or acuity I expect that the services needed 

warrant INPATIENT care.: Yes





- Plan Summary


Plan Summary: 





(1) UTI due to extended-spectrum beta lactamase (ESBL) producing Escherichia 

coli


Is this a current diagnosis for this admission?: Yes  


Continue Bactrim DS day #2





(2) Diaper rash


Is this a current diagnosis for this admission?: Yes   


Plan: 


Today is her day #5 of therapy. 





(3) Aspiration pneumonia--ruled out


Qualifiers: 


   Aspiration pneumonia type: due to vomit   Laterality: right   Lung location: 

lower lobe of lung   Qualified Code(s): J69.0 - Pneumonitis due to inhalation 

of food and vomit   


Is this a current diagnosis for this admission?: Yes   


Plan: 


This has been ruled out


Today is day 2 of Bactrim for UTI, status post 4 days of meropenem prior.





(4) Hyperlipidemia


Qualifiers: 


   Hyperlipidemia type: unspecified   Qualified Code(s): E78.5 - Hyperlipidemia

, unspecified   


Is this a current diagnosis for this admission?: Yes   


Plan: 


Continue statin





(5) Hypokalemia


Is this a current diagnosis for this admission?: Yes   


Plan: 


Repleted 5/7, continue to monitor





(6) Hypothyroidism


Qualifiers: 


   Hypothyroidism type: acquired   Qualified Code(s): E03.9 - Hypothyroidism, 

unspecified   


Is this a current diagnosis for this admission?: Yes   


Plan: 


Continue Levoxyl





(7) Dementia


Qualifiers: 


   Dementia type: unspecified type   Dementia behavioral disturbance: without 

behavioral disturbance   Qualified Code(s): F03.90 - Unspecified dementia 

without behavioral disturbance   


Is this a current diagnosis for this admission?: Yes   


Plan: 


She is unable make decisions for herself.  She normally resides at the [facility].  

Possible discharge back when  able to arrange/contact the son.

## 2018-06-09 LAB
ADD MANUAL DIFF: NO
ANION GAP SERPL CALC-SCNC: 10 MMOL/L (ref 5–19)
BASOPHILS # BLD AUTO: 0.1 10^3/UL (ref 0–0.2)
BASOPHILS NFR BLD AUTO: 0.9 % (ref 0–2)
BUN SERPL-MCNC: 8 MG/DL (ref 7–20)
CALCIUM: 8.6 MG/DL (ref 8.4–10.2)
CHLORIDE SERPL-SCNC: 106 MMOL/L (ref 98–107)
CO2 SERPL-SCNC: 26 MMOL/L (ref 22–30)
EOSINOPHIL # BLD AUTO: 0.4 10^3/UL (ref 0–0.6)
EOSINOPHIL NFR BLD AUTO: 6 % (ref 0–6)
ERYTHROCYTE [DISTWIDTH] IN BLOOD BY AUTOMATED COUNT: 13 % (ref 11.5–14)
GLUCOSE SERPL-MCNC: 98 MG/DL (ref 75–110)
HCT VFR BLD CALC: 28.4 % (ref 36–47)
HGB BLD-MCNC: 9.9 G/DL (ref 12–15.5)
LYMPHOCYTES # BLD AUTO: 1.5 10^3/UL (ref 0.5–4.7)
LYMPHOCYTES NFR BLD AUTO: 24.4 % (ref 13–45)
MCH RBC QN AUTO: 30.5 PG (ref 27–33.4)
MCHC RBC AUTO-ENTMCNC: 35 G/DL (ref 32–36)
MCV RBC AUTO: 87 FL (ref 80–97)
MONOCYTES # BLD AUTO: 0.5 10^3/UL (ref 0.1–1.4)
MONOCYTES NFR BLD AUTO: 8 % (ref 3–13)
NEUTROPHILS # BLD AUTO: 3.7 10^3/UL (ref 1.7–8.2)
NEUTS SEG NFR BLD AUTO: 60.7 % (ref 42–78)
PLATELET # BLD: 345 10^3/UL (ref 150–450)
POTASSIUM SERPL-SCNC: 4.5 MMOL/L (ref 3.6–5)
RBC # BLD AUTO: 3.25 10^6/UL (ref 3.72–5.28)
SODIUM SERPL-SCNC: 141.8 MMOL/L (ref 137–145)
TOTAL CELLS COUNTED % (AUTO): 100 %
WBC # BLD AUTO: 6.1 10^3/UL (ref 4–10.5)

## 2018-06-09 RX ADMIN — SULFAMETHOXAZOLE AND TRIMETHOPRIM SCH TAB: 800; 160 TABLET ORAL at 21:00

## 2018-06-09 RX ADMIN — NYSTATIN SCH APPLIC: 100000 POWDER TOPICAL at 17:54

## 2018-06-09 RX ADMIN — DEXTROSE, SODIUM CHLORIDE, AND POTASSIUM CHLORIDE PRN ML: 5; .45; .3 INJECTION INTRAVENOUS at 21:01

## 2018-06-09 RX ADMIN — PROBIOTIC PRODUCT - TAB SCH MG: TAB at 17:54

## 2018-06-09 RX ADMIN — DIVALPROEX SODIUM SCH MG: 250 TABLET, FILM COATED, EXTENDED RELEASE ORAL at 21:01

## 2018-06-09 RX ADMIN — PROBIOTIC PRODUCT - TAB SCH MG: TAB at 10:06

## 2018-06-09 RX ADMIN — RIVASTIGMINE TARTRATE SCH MG: 1.5 CAPSULE ORAL at 10:11

## 2018-06-09 RX ADMIN — ASPIRIN 325 MG ORAL TABLET SCH MG: 325 PILL ORAL at 10:06

## 2018-06-09 RX ADMIN — NYSTATIN SCH APPLIC: 100000 POWDER TOPICAL at 10:06

## 2018-06-09 RX ADMIN — MULTIVITAMIN TABLET SCH TAB: TABLET at 10:06

## 2018-06-09 RX ADMIN — RIVASTIGMINE TARTRATE SCH MG: 1.5 CAPSULE ORAL at 17:54

## 2018-06-09 RX ADMIN — SULFAMETHOXAZOLE AND TRIMETHOPRIM SCH TAB: 800; 160 TABLET ORAL at 10:06

## 2018-06-09 RX ADMIN — ATORVASTATIN CALCIUM SCH MG: 10 TABLET, FILM COATED ORAL at 21:00

## 2018-06-09 RX ADMIN — DEXTROSE, SODIUM CHLORIDE, AND POTASSIUM CHLORIDE PRN ML: 5; .45; .3 INJECTION INTRAVENOUS at 06:17

## 2018-06-09 RX ADMIN — SENNOSIDES, DOCUSATE SODIUM SCH EACH: 50; 8.6 TABLET, FILM COATED ORAL at 21:01

## 2018-06-09 RX ADMIN — LANSOPRAZOLE SCH MG: 30 TABLET, ORALLY DISINTEGRATING, DELAYED RELEASE ORAL at 06:17

## 2018-06-09 RX ADMIN — LEVOTHYROXINE SODIUM SCH MG: 100 TABLET ORAL at 06:17

## 2018-06-09 RX ADMIN — ENOXAPARIN SODIUM SCH MG: 40 INJECTION SUBCUTANEOUS at 10:06

## 2018-06-09 RX ADMIN — LACTULOSE SCH GM: 20 SOLUTION ORAL at 17:54

## 2018-06-09 NOTE — PDOC PROGRESS REPORT
Subjective


Progress Note for:: 06/09/18


Subjective:: 





Today, 6/9/18--


Awake and alert, does not speak or follow commands.


Currently afebrile and appears comfortable.


P.o. Bactrim at this time, awaiting discharge back to nursing home.





Summary


73 yr old female with severe dementia, resident of [facility].


Presented with fever and UTI.





She developed an acute episode of dyspnea and hypoxia on 6/4/18 thought 

secondary to aspiration. However, 6/7/18 patient evaluated by Dr. Beltre of [facility] of 

[facility] and ded not think she has pneumonia.  Recommended switching antibiotics to 

Bactrim DS twice daily p.o.  Patient completed for 5 days of meropenem.





Urine cultures grew ESBL producing E. coli.


Blood cultures negative x 5 days.








Reason For Visit: 


URINARY TRACT INFECTION








Physical Exam


Vital Signs: 


 











Temp Pulse Resp BP Pulse Ox


 


 97.8 F   80   16   141/62 H  96 


 


 06/09/18 16:56  06/09/18 16:56  06/09/18 16:56  06/09/18 16:56  06/09/18 16:56








 Intake & Output











 06/08/18 06/09/18 06/10/18





 06:59 06:59 06:59


 


Intake Total 2074 2610 


 


Output Total 825 1800 


 


Balance 1249 810 


 


Weight 69.5 kg 69.5 kg 











General appearance: PRESENT: no acute distress


Head exam: PRESENT: normocephalic


Ear exam: PRESENT: normal external ear exam


Neck exam: ABSENT: tracheal deviation


Respiratory exam: PRESENT: symmetrical, unlabored


Cardiovascular exam: PRESENT: RRR, systolic murmur


GI/Abdominal exam: PRESENT: normal bowel sounds, soft.  ABSENT: tenderness


Extremities exam: ABSENT: pedal edema








Results


Laboratory Results: 


 





 06/09/18 07:20 





 06/09/18 07:20 





 











  06/09/18 06/09/18





  07:20 07:20


 


WBC  6.1 


 


RBC  3.25 L 


 


Hgb  9.9 L 


 


Hct  28.4 L 


 


MCV  87 


 


MCH  30.5 


 


MCHC  35.0 


 


RDW  13.0 


 


Plt Count  345 


 


Seg Neutrophils %  60.7 


 


Lymphocytes %  24.4 


 


Monocytes %  8.0 


 


Eosinophils %  6.0 


 


Basophils %  0.9 


 


Absolute Neutrophils  3.7 


 


Absolute Lymphocytes  1.5 


 


Absolute Monocytes  0.5 


 


Absolute Eosinophils  0.4 


 


Absolute Basophils  0.1 


 


Sodium   141.8


 


Potassium   4.5


 


Chloride   106


 


Carbon Dioxide   26


 


Anion Gap   10


 


BUN   8


 


Creatinine   0.47 L


 


Est GFR ( Amer)   > 60


 


Est GFR (Non-Af Amer)   > 60


 


Glucose   98


 


Calcium   8.6








 











  06/04/18 06/06/18





  05:19 08:36


 


NT-Pro-B Natriuret Pep  6340 H  86010 H











Impressions: 


 





Chest X-Ray  06/06/18 00:00


IMPRESSION:  IMPROVED AERATION IN THE LUNG BASES.


 








Modified Barium Swallow  06/06/18 00:00


IMPRESSION:  LARYNGEAL PENETRATION WITH THIN LIQUIDS.  NO ASPIRATION 

SEEN.PLEASE SEE SPEECH PATHOLOGIST REPORT FOR OTHER FINDINGS AND 

RECOMMENDATIONS.


 














Assessment & Plan





- Plan Summary


Plan Summary: 





(1) UTI due to extended-spectrum beta lactamase (ESBL) producing Escherichia 

coli


Is this a current diagnosis for this admission?: Yes  


Continue Bactrim DS day #3





(2) Diaper rash


Is this a current diagnosis for this admission?: Yes   


Plan: 


Today is her day #6 of therapy. 





(3) Aspiration pneumonia--ruled out


Qualifiers: 


   Aspiration pneumonia type: due to vomit   Laterality: right   Lung location: 

lower lobe of lung   Qualified Code(s): J69.0 - Pneumonitis due to inhalation 

of food and vomit   


Is this a current diagnosis for this admission?: Yes   


Plan: 


This has been ruled out


Today is day 3 of Bactrim for UTI, status post 4 days of meropenem prior.





(4) Hyperlipidemia


Qualifiers: 


   Hyperlipidemia type: unspecified   Qualified Code(s): E78.5 - Hyperlipidemia

, unspecified   


Is this a current diagnosis for this admission?: Yes   


Plan: 


Continue statin





(5) Hypokalemia


Is this a current diagnosis for this admission?: Yes   


Plan: 


Repleted 5/7, continue to monitor





(6) Hypothyroidism


Qualifiers: 


   Hypothyroidism type: acquired   Qualified Code(s): E03.9 - Hypothyroidism, 

unspecified   


Is this a current diagnosis for this admission?: Yes   


Plan: 


Continue Levoxyl





(7) Dementia


Qualifiers: 


   Dementia type: unspecified type   Dementia behavioral disturbance: without 

behavioral disturbance   Qualified Code(s): F03.90 - Unspecified dementia 

without behavioral disturbance   


Is this a current diagnosis for this admission?: Yes   


Plan: 


She is unable make decisions for herself.  She normally resides at the [facility].  

Ready for discharge back when  able to arrange/contact the son.

## 2018-06-10 RX ADMIN — MULTIVITAMIN TABLET SCH TAB: TABLET at 09:32

## 2018-06-10 RX ADMIN — NYSTATIN SCH APPLIC: 100000 POWDER TOPICAL at 09:32

## 2018-06-10 RX ADMIN — DIVALPROEX SODIUM SCH MG: 250 TABLET, FILM COATED, EXTENDED RELEASE ORAL at 22:07

## 2018-06-10 RX ADMIN — ATORVASTATIN CALCIUM SCH MG: 10 TABLET, FILM COATED ORAL at 22:08

## 2018-06-10 RX ADMIN — RIVASTIGMINE TARTRATE SCH MG: 1.5 CAPSULE ORAL at 09:31

## 2018-06-10 RX ADMIN — DEXTROSE, SODIUM CHLORIDE, AND POTASSIUM CHLORIDE PRN ML: 5; .45; .3 INJECTION INTRAVENOUS at 12:00

## 2018-06-10 RX ADMIN — LEVOTHYROXINE SODIUM SCH MG: 100 TABLET ORAL at 07:15

## 2018-06-10 RX ADMIN — SENNOSIDES, DOCUSATE SODIUM SCH EACH: 50; 8.6 TABLET, FILM COATED ORAL at 22:08

## 2018-06-10 RX ADMIN — RIVASTIGMINE TARTRATE SCH MG: 1.5 CAPSULE ORAL at 16:47

## 2018-06-10 RX ADMIN — LACTULOSE SCH GM: 20 SOLUTION ORAL at 17:49

## 2018-06-10 RX ADMIN — PROBIOTIC PRODUCT - TAB SCH MG: TAB at 09:31

## 2018-06-10 RX ADMIN — SULFAMETHOXAZOLE AND TRIMETHOPRIM SCH TAB: 800; 160 TABLET ORAL at 09:32

## 2018-06-10 RX ADMIN — LANSOPRAZOLE SCH MG: 30 TABLET, ORALLY DISINTEGRATING, DELAYED RELEASE ORAL at 07:14

## 2018-06-10 RX ADMIN — ENOXAPARIN SODIUM SCH MG: 40 INJECTION SUBCUTANEOUS at 09:31

## 2018-06-10 RX ADMIN — SULFAMETHOXAZOLE AND TRIMETHOPRIM SCH TAB: 800; 160 TABLET ORAL at 22:08

## 2018-06-10 RX ADMIN — ASPIRIN 325 MG ORAL TABLET SCH MG: 325 PILL ORAL at 09:32

## 2018-06-10 RX ADMIN — PROBIOTIC PRODUCT - TAB SCH MG: TAB at 17:49

## 2018-06-10 NOTE — PDOC PROGRESS REPORT
Subjective


Progress Note for:: 06/10/18


Subjective:: 





Patient is awake and alert, does not speak or follow commands.


Currently afebrile and appears comfortable.


Now on p.o. Bactrim, awaiting discharge back to nursing home.





Summary


73 yr old female with severe dementia, resident of [facility].


Presented with fever and UTI.





She developed an acute episode of dyspnea and hypoxia on 6/4/18 thought 

secondary to aspiration. However, 6/7/18 patient evaluated by Dr. Beltre of ID of 

[facility] and ded not think she has pneumonia.  Recommended switching antibiotics to 

Bactrim DS twice daily p.o.  Patient completed for 5 days of meropenem.





Urine cultures grew ESBL producing E. coli.


Blood cultures negative x 5 days.








Reason For Visit: 


URINARY TRACT INFECTION








Physical Exam


Vital Signs: 


 











Temp Pulse Resp BP Pulse Ox


 


 98 F   88   16   104/62   92 


 


 06/10/18 12:00  06/10/18 12:00  06/10/18 12:00  06/10/18 12:00  06/10/18 12:00








 Intake & Output











 06/09/18 06/10/18 06/11/18





 06:59 06:59 06:59


 


Intake Total 2610 1679 


 


Output Total 1800 3400 


 


Balance 810 -1721 


 


Weight 69.5 kg 68.2 kg 











General appearance: PRESENT: no acute distress


Head exam: PRESENT: normocephalic


Ear exam: PRESENT: normal external ear exam


Neck exam: ABSENT: tracheal deviation


Respiratory exam: PRESENT: symmetrical, unlabored


Cardiovascular exam: PRESENT: RRR, NLS1S2, systolic murmur


GI/Abdominal exam: PRESENT: normal bowel sounds, soft.  ABSENT: tenderness


Extremities exam: ABSENT: pedal edema











Results


Laboratory Results: 


 





 06/09/18 07:20 





 06/09/18 07:20 





 











  06/04/18 06/06/18





  05:19 08:36


 


NT-Pro-B Natriuret Pep  6340 H  69994 H











Impressions: 


 





Chest X-Ray  06/06/18 00:00


IMPRESSION:  IMPROVED AERATION IN THE LUNG BASES.


 








Modified Barium Swallow  06/06/18 00:00


IMPRESSION:  LARYNGEAL PENETRATION WITH THIN LIQUIDS.  NO ASPIRATION 

SEEN.PLEASE SEE SPEECH PATHOLOGIST REPORT FOR OTHER FINDINGS AND 

RECOMMENDATIONS.


 














Assessment & Plan





- Plan Summary


Plan Summary: 





(1) UTI due to extended-spectrum beta lactamase (ESBL) producing Escherichia 

coli


Is this a current diagnosis for this admission?: Yes  


Continue Bactrim DS day #4


-This is day 8 of antibiotics altogether.  Leukocytosis has resolved. Will 

discontinue Bactrim/abx at this time.





(2) Diaper rash


Is this a current diagnosis for this admission?: Yes   


Plan: 


Today is her day #7 of therapy. 





(3) Aspiration pneumonia--ruled out


Qualifiers: 


   Aspiration pneumonia type: due to vomit   Laterality: right   Lung location: 

lower lobe of lung   Qualified Code(s): J69.0 - Pneumonitis due to inhalation 

of food and vomit   


Is this a current diagnosis for this admission?: Yes   


Plan: 


This has been ruled out


Today is day 4 of Bactrim for UTI, status post 4 days of meropenem prior.





(4) Hyperlipidemia


Qualifiers: 


   Hyperlipidemia type: unspecified   Qualified Code(s): E78.5 - Hyperlipidemia

, unspecified   


Is this a current diagnosis for this admission?: Yes   


Plan: 


Continue statin





(5) Hypokalemia


Is this a current diagnosis for this admission?: Yes   


Plan: 


Repleted 5/7, continue to monitor





(6) Hypothyroidism


Qualifiers: 


   Hypothyroidism type: acquired   Qualified Code(s): E03.9 - Hypothyroidism, 

unspecified   


Is this a current diagnosis for this admission?: Yes   


Plan: 


Continue Levoxyl





(7) Dementia


Qualifiers: 


   Dementia type: unspecified type   Dementia behavioral disturbance: without 

behavioral disturbance   Qualified Code(s): F03.90 - Unspecified dementia 

without behavioral disturbance   


Is this a current diagnosis for this admission?: Yes   


Plan: 


She is unable make decisions for herself.  She normally resides at the [facility].  

Ready for discharge back when  able to arrange transfer/contact 

the son.  She should be able to go tomorrow, after the weekend.

## 2018-06-11 VITALS — SYSTOLIC BLOOD PRESSURE: 102 MMHG | DIASTOLIC BLOOD PRESSURE: 60 MMHG

## 2018-06-11 RX ADMIN — PROBIOTIC PRODUCT - TAB SCH MG: TAB at 10:10

## 2018-06-11 RX ADMIN — SULFAMETHOXAZOLE AND TRIMETHOPRIM SCH TAB: 800; 160 TABLET ORAL at 10:10

## 2018-06-11 RX ADMIN — LEVOTHYROXINE SODIUM SCH MG: 100 TABLET ORAL at 05:48

## 2018-06-11 RX ADMIN — DEXTROSE, SODIUM CHLORIDE, AND POTASSIUM CHLORIDE PRN ML: 5; .45; .3 INJECTION INTRAVENOUS at 04:25

## 2018-06-11 RX ADMIN — MULTIVITAMIN TABLET SCH TAB: TABLET at 10:10

## 2018-06-11 RX ADMIN — LANSOPRAZOLE SCH MG: 30 TABLET, ORALLY DISINTEGRATING, DELAYED RELEASE ORAL at 05:48

## 2018-06-11 RX ADMIN — RIVASTIGMINE TARTRATE SCH MG: 1.5 CAPSULE ORAL at 15:20

## 2018-06-11 RX ADMIN — ENOXAPARIN SODIUM SCH MG: 40 INJECTION SUBCUTANEOUS at 10:10

## 2018-06-11 RX ADMIN — ASPIRIN 325 MG ORAL TABLET SCH MG: 325 PILL ORAL at 10:10

## 2018-06-11 RX ADMIN — RIVASTIGMINE TARTRATE SCH MG: 1.5 CAPSULE ORAL at 08:19

## 2018-06-11 NOTE — PDOC TRANSFER SUMMARY
General





- Admit/Disc Date/PCP


Admission Date/Primary Care Provider: 


  06/03/18 16:54





  NO LOCALMD





Discharge Date: 06/11/18





- Discharge Diagnosis


(1) Urinary tract infection


Is this a current diagnosis for this admission?: Yes   


Summary: 


ESBL E coli








(2) Diaper rash


Is this a current diagnosis for this admission?: Yes   


Summary: 


Resolved








(3) Dementia


Is this a current diagnosis for this admission?: Yes   





(4) Hypothyroidism


Is this a current diagnosis for this admission?: Yes   





(5) DVT prophylaxis


Is this a current diagnosis for this admission?: Yes   





(6) Full code status


Is this a current diagnosis for this admission?: Yes   





(7) Aspiration pneumonia


Is this a current diagnosis for this admission?: No   


Summary: 


This was ruled out








- Additional Information


Resuscitation Status: Full Code


Home Medications: 








Acetaminophen [Tylenol 325 mg Tablet] 650 mg PO Q6HP PRN 06/04/18 


Aspirin [Aspirin 325 mg Tablet] 325 mg PO DAILY 06/04/18 


Cold Cream/Zinc Oxide/Star/Joni [Dermacloud Ointment] 1 applic TP ASDIR PRN 06/04 /18 


Divalproex Sodium [Depakote  mg Tablet] 750 mg PO QHS 06/04/18 


Lactulose [Constulose 10 gm/15 mL Oral Solution] 20 gm PO BID 06/04/18 


Levothyroxine Sodium [Synthroid] 100 mcg PO Q6AM 06/04/18 


Magnesium Hydroxide [Milk of Magnesia 30 ml Udcup] 30 ml PO DAILYP PRN 06/04/18 


Meloxicam [Mobic] 7.5 mg PO DAILY 06/04/18 


Neomy Sulf/Bacitrac Zn/Poly [Triple Antibiotic Ointment] 28 gm TP DAILYP PRN 06/ 04/18 


Omeprazole 20 mg PO QAM 06/04/18 


Sennosides/Docusate Sodium [Senna-S Tablet] 2 each PO HSP PRN 06/04/18 


Acetaminophen [Tylenol 325 mg Tablet] 650 mg PO Q6HP PRN  tablet 06/11/18 


Aspirin [Aspirin 325 mg Tablet] 325 mg PO DAILY  tablet 06/11/18 


Atorvastatin Calcium [Lipitor 10 mg Tablet] 10 mg PO QHS  tablet 06/11/18 


Divalproex Sodium [Depakote  mg Tablet] 750 mg PO QHS  tab.sr.24h 06/11/ 18 


Lactobacillus Acidophilus [Bacid 250 mg Tablet] 500 mg PO BID  tab 06/11/18 


Lactulose [Cephulac Syrup 20 gm/30 ml Udcup] 10 gm PO QPM  udc 06/11/18 


Levothyroxine Sodium [Synthroid 0.1 mg Tablet] 0.1 mg PO Q6AM  tablet 06/11/18 


Meloxicam [Mobic 7.5 mg Tablet] 7.5 mg PO DAILYP PRN  tablet 06/11/18 


Ondansetron [Zofran Odt 4 mg Tablet] 4 mg PO Q4HP PRN  tab.rapdis 06/11/18 


Rivastigmine Tartrate [Exelon 1.5 mg Capsule] 3 mg PO BIDACBS  capsule 06/11/18 


Sennosides/Docusate 8.6-50 mg [Senna Plus Tablet] 2 each PO QHS  tablet 06/11/ 18 


Sulfamethoxazole/Trimethoprim [Septra-Ds 800-160 mg Tablet] 1 tab PO Q12 3 Days

  tablet 06/11/18 











History of Present Illness


Admission Date/PCP: 


  06/03/18 16:54





  NO LOCALMD





History of Present Illness: 


JENNIFER BROWN is a 73 year old female with





Dementia lines in the [facility] facility


Hypothyroidism


Hypertension


Hyperlipidemia


GERD


Depression





Healthcare POA is her son Kee Brown ph 


He states that she is to be a FULL CODE.





The patient was sent to the ER due to fever and was found to have a UTI.


She was treated with IV fluids and Levaquin and referred for admission.





Urine cultures were positive for ESBL producing E coli, she was on Meropenem 

which was later switched to Bactrim.


The patient was noted to have a diaper rash over the pubic and groin area most 

likely due to urinary incontinence.


This cleared with local care.





Video barium swallow evaluation showed that she was okay for pured food and 

thin liquids.





She is stable and ready for discharge.








Physical Exam


Vital Signs: 


 











Temp Pulse Resp BP Pulse Ox


 


 98.5 F   85   18   99/57 L  97 


 


 06/11/18 11:22  06/11/18 11:22  06/11/18 11:22  06/11/18 11:22  06/11/18 11:22








 Intake & Output











 06/10/18 06/11/18 06/12/18





 06:59 06:59 06:59


 


Intake Total 1679 1521 


 


Output Total 8079 6580 


 


Balance -1721 -1079 


 


Weight 68.2 kg 68.4 kg 











General appearance: PRESENT: no acute distress


Respiratory exam: PRESENT: symmetrical, unlabored





Results


Laboratory Results: 


 





 06/09/18 07:20 





 06/09/18 07:20 





 











  06/04/18 06/06/18





  05:19 08:36


 


NT-Pro-B Natriuret Pep  6340 H  27247 H











Impressions: 


 





Chest X-Ray  06/06/18 00:00


IMPRESSION:  IMPROVED AERATION IN THE LUNG BASES.


 








Modified Barium Swallow  06/06/18 00:00


IMPRESSION:  LARYNGEAL PENETRATION WITH THIN LIQUIDS.  NO ASPIRATION 

SEEN.PLEASE SEE SPEECH PATHOLOGIST REPORT FOR OTHER FINDINGS AND 

RECOMMENDATIONS.


 














Transfer Plan





- Time Spent with Patient


Time spent with patient: Less than 30 Minutes





Qualifiers





- *


PATIENT BEING DISCHARGED WITH ANY OF THE FOLLOWING DIAGNOSIS: No





Plan


Time Spent: Greater than 30 Minutes

## 2018-07-10 ENCOUNTER — HOSPITAL ENCOUNTER (EMERGENCY)
Dept: HOSPITAL 62 - ER | Age: 73
Discharge: NURSING, REHAB OR LONG-TERM CARE | End: 2018-07-10
Payer: COMMERCIAL

## 2018-07-10 VITALS — SYSTOLIC BLOOD PRESSURE: 102 MMHG | DIASTOLIC BLOOD PRESSURE: 60 MMHG

## 2018-07-10 DIAGNOSIS — Z04.3: Primary | ICD-10-CM

## 2018-07-10 DIAGNOSIS — I10: ICD-10-CM

## 2018-07-10 DIAGNOSIS — F03.90: ICD-10-CM

## 2018-07-10 PROCEDURE — 99284 EMERGENCY DEPT VISIT MOD MDM: CPT

## 2018-07-10 PROCEDURE — 70450 CT HEAD/BRAIN W/O DYE: CPT

## 2018-07-10 NOTE — ER DOCUMENT REPORT
ED General





- General


Stated Complaint: FALL


Time Seen by Provider: 07/10/18 04:06


Mode of Arrival: Medic


Information source: Emergency Med Personnel


Notes: 


73 year old female brought to the ED by EMS from nursing facility s/p fall. 

Patient was found laying next to her bed. Patient is at her normal mental 

status baseline per nursing facility. 





TRAVEL OUTSIDE OF THE [place] IN LAST 30 DAYS: No





- HPI


Onset: Just prior to arrival


Onset/Duration: Sudden


Quality of pain: No pain


Associated symptoms: None


Exacerbated by: Denies


Relieved by: Denies





- Related Data


Allergies/Adverse Reactions: 


 





No Known Allergies Allergy (Verified 11/09/17 15:12)


 











Past Medical History





- Social History


Smoking Status: Unknown if Ever Smoked


Family History: Hypertension





- Past Medical History


Cardiac Medical History: Reports: Hx Hypercholesterolemia, Hx Hypertension


Renal/ Medical History: Denies: Hx Peritoneal Dialysis


GI Medical History: Reports: Hx Gastroesophageal Reflux Disease


Psychiatric Medical History: Reports: Hx Dementia, Hx Depression


Past Surgical History: Reports: Hx Hysterectomy, Hx Orthopedic Surgery - carpal 

tunnel





- Immunizations


Hx Diphtheria, Pertussis, Tetanus Vaccination: Yes





Review of Systems





- Review of Systems


-: Yes ROS unobtainable due to patient's medical condition





Physical Exam





- Vital signs


Vitals: 


 











Temp Pulse Resp BP Pulse Ox


 


 98.7 F   88   14   104/58 L  94 


 


 07/10/18 03:18  07/10/18 03:18  07/10/18 03:18  07/10/18 03:18  07/10/18 03:18











Interpretation: Normal





- Notes


Notes: 





PHYSICAL EXAMINATION:





GENERAL: Frail. 





HEAD: Atraumatic. 





EYES: Pupils equal round and reactive to light, extraocular movements intact, 

conjunctiva are normal.





ENT: Nares patent, oropharynx clear without exudates.  Moist mucous membranes.





NECK: Normal range of motion, supple without lymphadenopathy





LUNGS: Breath sounds clear to auscultation bilaterally and equal.  No wheezes 

rales or rhonchi.





HEART: Regular rate and rhythm without murmurs





ABDOMEN: Soft, nontender, nondistended abdomen.  No guarding, no rebound.  





Female : deferred





Musculoskeletal: Normal range of motion, no pitting or edema.  No cyanosis.





NEUROLOGICAL: Cranial nerves grossly intact. 





SKIN: Warm, Dry, normal turgor, no rashes or lesions noted.





Course





- Re-evaluation


Re-evalutation: 





07/10/18 05:42


Patient moving all extremities. No tenderness to palpation of the extremities. 

CT head normal. I will discharge patient back to nursing facility. 





- Vital Signs


Vital signs: 


 











Temp Pulse Resp BP Pulse Ox


 


 98.7 F   88   14   104/58 L  94 


 


 07/10/18 03:18  07/10/18 03:18  07/10/18 03:18  07/10/18 03:18  07/10/18 03:18














Discharge





- Discharge


Clinical Impression: 


Fall


Qualifiers:


 Encounter type: initial encounter Qualified Code(s): W19.XXXA - Unspecified 

fall, initial encounter





Dementia


Qualifiers:


 Dementia type: unspecified type Dementia behavioral disturbance: without 

behavioral disturbance Qualified Code(s): F03.90 - Unspecified dementia without 

behavioral disturbance





Condition: Good


Disposition: [facility]


Instructions:  Head Injury Precautions ([facility])


Referrals: 


LOCALMD,NO [Primary Care Provider] - Follow up as needed


NGOZI LAGUERRE MD [ACTIVE STAFF] - Follow up as needed

## 2018-07-10 NOTE — RADIOLOGY REPORT ([facility])
EXAM DESCRIPTION: 



CT HEAD WITHOUT IV CONTRAST



COMPLETED DATE/TME:  07/10/2018 04:18







EXAM DESCRIPTION: CT of the head without contrast



CLINICAL HISTORY: fall



COMPARISON: 5/2/2018



TECHNIQUE: Axial CT of the head obtained from the skull apex to

the skull base without contrast.



FINDINGS: 

No acute intracranial hemorrhage identified. No mass, mass

effect, shift of the midline, abnormal extra-axial fluid

collection or CT evidence of acute ischemic change identified.

The ventricular system and sulcal spaces are mildly enlarged

compatible with mild cerebral atrophy.  Confluent areas of

hypodensity throughout the supratentorial white matter are

nonspecific and may be related to chronic small vessel ischemic

change.



The visualized paranasal sinuses and the mastoids are clear.

  No skull fracture identified.  Visualized orbits and globes are

unremarkable. Atherosclerotic calcification of the intracranial

internal carotid arteries.



DLP: 1017.17 mGy-cm



IMPRESSION:

1.  No acute intracranial abnormality by CT criteria.



This exam was performed according to our departmental

dose-optimization program, which includes automated exposure

control, adjustment of the mA and/or kV according to patient size

and/or use of iterative reconstruction technique.

## 2022-01-09 NOTE — EKG REPORT
SEVERITY:- ABNORMAL ECG -

SINUS RHYTHM

LEFT ATRIAL ABNORMALITY

PROBABLE LEFT VENTRICULAR HYPERTROPHY

:

Confirmed by: Noman Braga 17-Sep-2017 23:22:52 Acute URI